# Patient Record
Sex: MALE | Race: WHITE | NOT HISPANIC OR LATINO | Employment: FULL TIME | URBAN - METROPOLITAN AREA
[De-identification: names, ages, dates, MRNs, and addresses within clinical notes are randomized per-mention and may not be internally consistent; named-entity substitution may affect disease eponyms.]

---

## 2018-10-02 ENCOUNTER — CONVERSION ENCOUNTER (OUTPATIENT)
Dept: GASTROENTEROLOGY | Facility: CLINIC | Age: 43
End: 2018-10-02

## 2018-10-02 ENCOUNTER — OFFICE VISIT CONVERTED (OUTPATIENT)
Dept: GASTROENTEROLOGY | Facility: CLINIC | Age: 43
End: 2018-10-02
Attending: INTERNAL MEDICINE

## 2018-12-28 ENCOUNTER — CONVERSION ENCOUNTER (OUTPATIENT)
Dept: GASTROENTEROLOGY | Facility: CLINIC | Age: 43
End: 2018-12-28

## 2018-12-28 ENCOUNTER — OFFICE VISIT CONVERTED (OUTPATIENT)
Dept: GASTROENTEROLOGY | Facility: CLINIC | Age: 43
End: 2018-12-28
Attending: PHYSICIAN ASSISTANT

## 2019-05-28 ENCOUNTER — CONVERSION ENCOUNTER (OUTPATIENT)
Dept: FAMILY MEDICINE CLINIC | Facility: CLINIC | Age: 44
End: 2019-05-28

## 2019-05-28 ENCOUNTER — OFFICE VISIT CONVERTED (OUTPATIENT)
Dept: FAMILY MEDICINE CLINIC | Facility: CLINIC | Age: 44
End: 2019-05-28
Attending: FAMILY MEDICINE

## 2019-11-26 ENCOUNTER — OFFICE VISIT CONVERTED (OUTPATIENT)
Dept: FAMILY MEDICINE CLINIC | Facility: CLINIC | Age: 44
End: 2019-11-26
Attending: FAMILY MEDICINE

## 2020-11-18 ENCOUNTER — OFFICE VISIT CONVERTED (OUTPATIENT)
Dept: FAMILY MEDICINE CLINIC | Facility: CLINIC | Age: 45
End: 2020-11-18
Attending: FAMILY MEDICINE

## 2021-05-09 VITALS
DIASTOLIC BLOOD PRESSURE: 90 MMHG | BODY MASS INDEX: 28.53 KG/M2 | OXYGEN SATURATION: 97 % | WEIGHT: 188.25 LBS | HEIGHT: 68 IN | HEART RATE: 83 BPM | TEMPERATURE: 97.2 F | SYSTOLIC BLOOD PRESSURE: 118 MMHG

## 2021-05-09 VITALS
DIASTOLIC BLOOD PRESSURE: 80 MMHG | TEMPERATURE: 97.1 F | OXYGEN SATURATION: 96 % | WEIGHT: 193.5 LBS | HEART RATE: 76 BPM | SYSTOLIC BLOOD PRESSURE: 122 MMHG

## 2021-05-09 VITALS
SYSTOLIC BLOOD PRESSURE: 104 MMHG | TEMPERATURE: 97 F | HEART RATE: 82 BPM | DIASTOLIC BLOOD PRESSURE: 80 MMHG | WEIGHT: 186 LBS | OXYGEN SATURATION: 98 %

## 2021-05-15 VITALS
DIASTOLIC BLOOD PRESSURE: 76 MMHG | SYSTOLIC BLOOD PRESSURE: 141 MMHG | BODY MASS INDEX: 28.95 KG/M2 | OXYGEN SATURATION: 100 % | WEIGHT: 191 LBS | HEIGHT: 68 IN | HEART RATE: 65 BPM

## 2021-05-16 VITALS
SYSTOLIC BLOOD PRESSURE: 119 MMHG | OXYGEN SATURATION: 100 % | WEIGHT: 188 LBS | HEIGHT: 68 IN | BODY MASS INDEX: 28.49 KG/M2 | DIASTOLIC BLOOD PRESSURE: 77 MMHG | HEART RATE: 67 BPM

## 2021-09-09 ENCOUNTER — OFFICE VISIT (OUTPATIENT)
Dept: FAMILY MEDICINE CLINIC | Facility: CLINIC | Age: 46
End: 2021-09-09

## 2021-09-09 VITALS
SYSTOLIC BLOOD PRESSURE: 120 MMHG | HEIGHT: 68 IN | OXYGEN SATURATION: 98 % | TEMPERATURE: 97.7 F | DIASTOLIC BLOOD PRESSURE: 78 MMHG | HEART RATE: 82 BPM | WEIGHT: 187 LBS | BODY MASS INDEX: 28.34 KG/M2

## 2021-09-09 DIAGNOSIS — Z00.00 WELLNESS EXAMINATION: ICD-10-CM

## 2021-09-09 DIAGNOSIS — M54.50 ACUTE LEFT-SIDED LOW BACK PAIN WITHOUT SCIATICA: ICD-10-CM

## 2021-09-09 DIAGNOSIS — Z13.220 SCREENING CHOLESTEROL LEVEL: Primary | ICD-10-CM

## 2021-09-09 DIAGNOSIS — L98.9 FACIAL LESION: ICD-10-CM

## 2021-09-09 LAB
ALBUMIN SERPL-MCNC: 4.8 G/DL (ref 3.5–5.2)
ALBUMIN/GLOB SERPL: 1.9 G/DL
ALP SERPL-CCNC: 74 U/L (ref 39–117)
ALT SERPL W P-5'-P-CCNC: 33 U/L (ref 1–41)
ANION GAP SERPL CALCULATED.3IONS-SCNC: 9.8 MMOL/L (ref 5–15)
AST SERPL-CCNC: 23 U/L (ref 1–40)
BASOPHILS # BLD AUTO: 0.03 10*3/MM3 (ref 0–0.2)
BASOPHILS NFR BLD AUTO: 0.6 % (ref 0–1.5)
BILIRUB SERPL-MCNC: 0.6 MG/DL (ref 0–1.2)
BUN SERPL-MCNC: 13 MG/DL (ref 6–20)
BUN/CREAT SERPL: 11.8 (ref 7–25)
CALCIUM SPEC-SCNC: 9.6 MG/DL (ref 8.6–10.5)
CHLORIDE SERPL-SCNC: 103 MMOL/L (ref 98–107)
CHOLEST SERPL-MCNC: 187 MG/DL (ref 0–200)
CO2 SERPL-SCNC: 25.2 MMOL/L (ref 22–29)
CREAT SERPL-MCNC: 1.1 MG/DL (ref 0.76–1.27)
DEPRECATED RDW RBC AUTO: 41 FL (ref 37–54)
EOSINOPHIL # BLD AUTO: 0.08 10*3/MM3 (ref 0–0.4)
EOSINOPHIL NFR BLD AUTO: 1.5 % (ref 0.3–6.2)
ERYTHROCYTE [DISTWIDTH] IN BLOOD BY AUTOMATED COUNT: 12.4 % (ref 12.3–15.4)
GFR SERPL CREATININE-BSD FRML MDRD: 72 ML/MIN/1.73
GLOBULIN UR ELPH-MCNC: 2.5 GM/DL
GLUCOSE SERPL-MCNC: 91 MG/DL (ref 65–99)
HCT VFR BLD AUTO: 45.9 % (ref 37.5–51)
HDLC SERPL-MCNC: 49 MG/DL (ref 40–60)
HGB BLD-MCNC: 15.2 G/DL (ref 13–17.7)
IMM GRANULOCYTES # BLD AUTO: 0.01 10*3/MM3 (ref 0–0.05)
IMM GRANULOCYTES NFR BLD AUTO: 0.2 % (ref 0–0.5)
LDLC SERPL CALC-MCNC: 127 MG/DL (ref 0–100)
LDLC/HDLC SERPL: 2.57 {RATIO}
LYMPHOCYTES # BLD AUTO: 1.68 10*3/MM3 (ref 0.7–3.1)
LYMPHOCYTES NFR BLD AUTO: 32 % (ref 19.6–45.3)
MCH RBC QN AUTO: 29.9 PG (ref 26.6–33)
MCHC RBC AUTO-ENTMCNC: 33.1 G/DL (ref 31.5–35.7)
MCV RBC AUTO: 90.2 FL (ref 79–97)
MONOCYTES # BLD AUTO: 0.49 10*3/MM3 (ref 0.1–0.9)
MONOCYTES NFR BLD AUTO: 9.3 % (ref 5–12)
NEUTROPHILS NFR BLD AUTO: 2.96 10*3/MM3 (ref 1.7–7)
NEUTROPHILS NFR BLD AUTO: 56.4 % (ref 42.7–76)
NRBC BLD AUTO-RTO: 0 /100 WBC (ref 0–0.2)
PLATELET # BLD AUTO: 258 10*3/MM3 (ref 140–450)
PMV BLD AUTO: 11.7 FL (ref 6–12)
POTASSIUM SERPL-SCNC: 4.1 MMOL/L (ref 3.5–5.2)
PROT SERPL-MCNC: 7.3 G/DL (ref 6–8.5)
RBC # BLD AUTO: 5.09 10*6/MM3 (ref 4.14–5.8)
SODIUM SERPL-SCNC: 138 MMOL/L (ref 136–145)
TRIGL SERPL-MCNC: 60 MG/DL (ref 0–150)
VLDLC SERPL-MCNC: 11 MG/DL (ref 5–40)
WBC # BLD AUTO: 5.25 10*3/MM3 (ref 3.4–10.8)

## 2021-09-09 PROCEDURE — 36415 COLL VENOUS BLD VENIPUNCTURE: CPT | Performed by: FAMILY MEDICINE

## 2021-09-09 PROCEDURE — 80053 COMPREHEN METABOLIC PANEL: CPT | Performed by: FAMILY MEDICINE

## 2021-09-09 PROCEDURE — 80061 LIPID PANEL: CPT | Performed by: FAMILY MEDICINE

## 2021-09-09 PROCEDURE — 99396 PREV VISIT EST AGE 40-64: CPT | Performed by: FAMILY MEDICINE

## 2021-09-09 PROCEDURE — 85025 COMPLETE CBC W/AUTO DIFF WBC: CPT | Performed by: FAMILY MEDICINE

## 2021-09-09 RX ORDER — FLUTICASONE PROPIONATE 50 MCG
SPRAY, SUSPENSION (ML) NASAL
COMMUNITY

## 2021-09-09 RX ORDER — FEXOFENADINE HCL 180 MG/1
1 TABLET ORAL DAILY
COMMUNITY
End: 2021-12-02 | Stop reason: SDUPTHER

## 2021-09-09 RX ORDER — TIZANIDINE 2 MG/1
2 TABLET ORAL EVERY 8 HOURS PRN
Qty: 30 TABLET | Refills: 0 | Status: SHIPPED | OUTPATIENT
Start: 2021-09-09 | End: 2021-09-20

## 2021-09-09 RX ORDER — MELOXICAM 15 MG/1
15 TABLET ORAL DAILY
Qty: 10 TABLET | Refills: 0 | Status: SHIPPED | OUTPATIENT
Start: 2021-09-09 | End: 2021-09-20

## 2021-09-09 RX ORDER — ALBUTEROL SULFATE 0.63 MG/3ML
SOLUTION RESPIRATORY (INHALATION)
COMMUNITY

## 2021-09-09 NOTE — PROGRESS NOTES
"Chief Complaint  Annual Exam (Would like general check up and fasting labs. ) and Hip Pain (Left hip pain occasionally. )    Subjective          Brendon Melara presents to [facility]  Pt last had colonoscopy 3 yrs ago and it was normal  Pt does not smoke  Pt has had TDAP and covid vaccines  Pt does not need pneumovax  Needs screening labs  Pt has left facial lesion that is rough x 6 yrs- pt has FH of melanoma  Discussed getting colonoscopy for colon cancer screening    Back Pain  This is a recurrent problem. Episode onset: 2 months. The problem occurs intermittently. The problem has been gradually worsening since onset. The pain is present in the lumbar spine. The pain does not radiate. The pain is moderate. The pain is worse during the day. The symptoms are aggravated by bending and twisting. Stiffness is present in the morning. Pertinent negatives include no abdominal pain, bladder incontinence, bowel incontinence, chest pain, dysuria, fever, headaches, leg pain, numbness, paresis, paresthesias, pelvic pain, perianal numbness, tingling, weakness or weight loss. He has tried nothing for the symptoms. The treatment provided no relief.       Objective   No Known Allergies  Immunization History   Administered Date(s) Administered   • COVID-19 (MODERNA) 05/18/2021, 06/15/2021   • Tdap 09/06/2016       Vital Signs:   Vitals:    09/09/21 1048   BP: 120/78   Pulse: 82   Temp: 97.7 °F (36.5 °C)   SpO2: 98%   Weight: 84.8 kg (187 lb)   Height: 172.7 cm (68\")       Physical Exam  Vitals reviewed.   Constitutional:       Appearance: Normal appearance. He is well-developed.   HENT:      Head: Normocephalic and atraumatic.      Right Ear: Tympanic membrane, ear canal and external ear normal.      Left Ear: Tympanic membrane, ear canal and external ear normal.      Nose: Nose normal.      Mouth/Throat:      Mouth: Mucous membranes are moist.      Pharynx: Oropharynx is clear. No oropharyngeal " exudate or posterior oropharyngeal erythema.   Eyes:      Extraocular Movements: Extraocular movements intact.      Conjunctiva/sclera: Conjunctivae normal.      Pupils: Pupils are equal, round, and reactive to light.   Neck:      Thyroid: No thyroid mass, thyromegaly or thyroid tenderness.   Cardiovascular:      Rate and Rhythm: Normal rate and regular rhythm.      Pulses: Normal pulses.      Heart sounds: Normal heart sounds. No murmur heard.   No friction rub. No gallop.    Pulmonary:      Effort: Pulmonary effort is normal.      Breath sounds: Normal breath sounds. No wheezing or rhonchi.   Abdominal:      General: Bowel sounds are normal. There is no distension.      Palpations: Abdomen is soft. There is no mass.      Tenderness: There is no abdominal tenderness. There is no guarding or rebound.      Hernia: No hernia is present.   Musculoskeletal:         General: Normal range of motion.      Cervical back: Normal range of motion and neck supple.      Comments: Left lower back paraspinal muscle tenderness, neg straight leg raise, no redness, warmth, bruising, or swelling, no vertebrae tenderness.   Lymphadenopathy:      Cervical: No cervical adenopathy.   Skin:     General: Skin is warm and dry.      Capillary Refill: Capillary refill takes less than 2 seconds.      Comments: Left cheek 6-8mm flat red rough skin lesion   Neurological:      General: No focal deficit present.      Mental Status: He is alert and oriented to person, place, and time.      Cranial Nerves: No cranial nerve deficit.   Psychiatric:         Mood and Affect: Mood and affect normal.         Behavior: Behavior normal.         Thought Content: Thought content normal.         Judgment: Judgment normal.        Result Review :   The following data was reviewed by: Johnnie Lozano MD on 09/09/2021:    Data reviewed: Radiologic studies I viewed and interpreted 3 views lumbar spine x-rays:no fxs.          Assessment and Plan    Diagnoses and all  orders for this visit:    1. Screening cholesterol level (Primary)  -     Lipid Panel    2. Wellness examination  -     CBC Auto Differential  -     Comprehensive Metabolic Panel  -     Lipid Panel    3. Acute left-sided low back pain without sciatica  -     Ambulatory Referral to Physical Therapy  -     XR Spine Lumbar 2 or 3 View (In Office)  -     meloxicam (Mobic) 15 MG tablet; Take 1 tablet by mouth Daily.  Dispense: 10 tablet; Refill: 0  -     tiZANidine (ZANAFLEX) 2 MG tablet; Take 1 tablet by mouth Every 8 (Eight) Hours As Needed for Muscle Spasms.  Dispense: 30 tablet; Refill: 0    4. Facial lesion  -     Ambulatory Referral to Dermatology            Follow Up   Return in about 1 month (around 10/9/2021) for Recheck.  Patient was given instructions and counseling regarding his condition or for health maintenance advice. Please see specific information pulled into the AVS if appropriate.

## 2021-09-09 NOTE — PROGRESS NOTES
Venipuncture Blood Specimen Collection  Venipuncture performed in left arm by Syl Major with good hemostasis. Patient tolerated the procedure well without complications.   09/09/21   Syl Major

## 2021-09-10 NOTE — PROGRESS NOTES
Labs show mild elevation of cholesterol.  Watch diet and exercise.  Follow-up if have any questions.

## 2021-09-20 DIAGNOSIS — M54.50 ACUTE LEFT-SIDED LOW BACK PAIN WITHOUT SCIATICA: ICD-10-CM

## 2021-09-20 RX ORDER — TIZANIDINE 2 MG/1
TABLET ORAL
Qty: 30 TABLET | Refills: 0 | Status: SHIPPED | OUTPATIENT
Start: 2021-09-20 | End: 2021-09-28

## 2021-09-20 RX ORDER — MELOXICAM 15 MG/1
15 TABLET ORAL DAILY
Qty: 10 TABLET | Refills: 0 | Status: SHIPPED | OUTPATIENT
Start: 2021-09-20 | End: 2021-09-28

## 2021-09-28 DIAGNOSIS — M54.50 ACUTE LEFT-SIDED LOW BACK PAIN WITHOUT SCIATICA: ICD-10-CM

## 2021-09-28 RX ORDER — MELOXICAM 15 MG/1
15 TABLET ORAL DAILY
Qty: 10 TABLET | Refills: 0 | Status: SHIPPED | OUTPATIENT
Start: 2021-09-28 | End: 2021-10-07

## 2021-09-28 RX ORDER — TIZANIDINE 2 MG/1
TABLET ORAL
Qty: 30 TABLET | Refills: 0 | Status: SHIPPED | OUTPATIENT
Start: 2021-09-28 | End: 2021-10-07

## 2021-10-07 DIAGNOSIS — M54.50 ACUTE LEFT-SIDED LOW BACK PAIN WITHOUT SCIATICA: ICD-10-CM

## 2021-10-07 RX ORDER — MELOXICAM 15 MG/1
15 TABLET ORAL DAILY
Qty: 10 TABLET | Refills: 0 | Status: SHIPPED | OUTPATIENT
Start: 2021-10-07 | End: 2021-10-18

## 2021-10-07 RX ORDER — TIZANIDINE 2 MG/1
TABLET ORAL
Qty: 30 TABLET | Refills: 0 | Status: SHIPPED | OUTPATIENT
Start: 2021-10-07 | End: 2021-10-18

## 2021-10-18 DIAGNOSIS — M54.50 ACUTE LEFT-SIDED LOW BACK PAIN WITHOUT SCIATICA: ICD-10-CM

## 2021-10-18 RX ORDER — TIZANIDINE 2 MG/1
TABLET ORAL
Qty: 30 TABLET | Refills: 0 | Status: SHIPPED | OUTPATIENT
Start: 2021-10-18 | End: 2021-10-25

## 2021-10-18 RX ORDER — MELOXICAM 15 MG/1
15 TABLET ORAL DAILY
Qty: 10 TABLET | Refills: 0 | Status: SHIPPED | OUTPATIENT
Start: 2021-10-18 | End: 2021-10-25

## 2021-10-25 DIAGNOSIS — M54.50 ACUTE LEFT-SIDED LOW BACK PAIN WITHOUT SCIATICA: ICD-10-CM

## 2021-10-25 RX ORDER — TIZANIDINE 2 MG/1
TABLET ORAL
Qty: 30 TABLET | Refills: 0 | Status: SHIPPED | OUTPATIENT
Start: 2021-10-25

## 2021-10-25 RX ORDER — MELOXICAM 15 MG/1
15 TABLET ORAL DAILY
Qty: 10 TABLET | Refills: 0 | Status: SHIPPED | OUTPATIENT
Start: 2021-10-25

## 2021-11-04 ENCOUNTER — TREATMENT (OUTPATIENT)
Dept: PHYSICAL THERAPY | Facility: CLINIC | Age: 46
End: 2021-11-04

## 2021-11-04 DIAGNOSIS — M54.50 ACUTE LEFT-SIDED LOW BACK PAIN WITHOUT SCIATICA: Primary | ICD-10-CM

## 2021-11-04 DIAGNOSIS — M25.60 STIFFNESS IN JOINT: ICD-10-CM

## 2021-11-04 PROCEDURE — 97110 THERAPEUTIC EXERCISES: CPT | Performed by: PHYSICAL THERAPIST

## 2021-11-04 PROCEDURE — 97161 PT EVAL LOW COMPLEX 20 MIN: CPT | Performed by: PHYSICAL THERAPIST

## 2021-11-04 NOTE — PROGRESS NOTES
Physical Therapy Initial Evaluation and Plan of Care    Patient: Brendon Melara   : 1975  Diagnosis/ICD-10 Code:  Acute left-sided low back pain without sciatica [M54.50]  Referring practitioner: Johnnie Lozano MD  Date of Initial Visit: 2021  Today's Date: 2021  Patient seen for 1 sessions           Subjective Questionnaire: Oswestry: 10/50 = 20% Disability      Subjective Evaluation    History of Present Illness  Mechanism of injury: The patient presents to physical therapy with complaints of left sided low back pain. This pain is sharp and occurs quickly. The pain is located on the left side just below his belt line in the back of his left hip. This pain has been occurring intermittently for nearly a year. The frequency has gradually worsened. His pain occurs with prolonged sitting (20-30 minutes) and occasionally while sleeping at night. For pain relief, he changes positions. He isn't taking anything regularly for pain management. He does take a prescribed muscle relaxer sparingly. He has a history of a slipped disc in his low back, right hip pain and bilateral knee pain from his time in the .     Pain  Current pain ratin  At worst pain ratin    Diagnostic Tests  X-ray: normal    Patient Goals  Patient goal: The patient would like to have decreased pain and be able to manage symptoms when they occur.           Objective          Postural Observations  Seated posture: Slight forward slump with increased thoracic kyphosisn and decreased lumbar lordosis.        Palpation     Additional Palpation Details  Mild tenderness to palpation in lower left lumbar paraspinals. No tenderness noted with palpation of posterior left hip musculature/bony anatomy.    Tenderness     Additional Tenderness Details  None noted in left hip    Neurological Testing     Additional Neurological Details  Sensation to light touch intact and equal bilaterally from L2-S1 except for right side  hyposensation in S1 dermatome.    Seated Slump: (-) bilaterally for increased sciatic neural tension    Supine passive SLR: (-) bilaterally for increased sciatic neural tension    Active Range of Motion     Lumbar   Flexion: 65 (Patient squatted when returning to standing rather than straightening up directly) degrees   Extension: 20 degrees   Left lateral flexion: Active left lumbar lateral flexion: 1'' above joint line.   Right lateral flexion: Active right lumbar lateral flexion: knee joint line.   Left rotation: Active left lumbar rotation: 75%   Right rotation: Active right lumbar rotation: 75%   Left Hip   Normal active range of motion    Right Hip   Normal active range of motion    Strength/Myotome Testing     Left Hip   Planes of Motion   Flexion: 4+  Extension: 4  Abduction: 4    Right Hip   Planes of Motion   Flexion: 5  Extension: 4+  Abduction: 4+    Left Knee   Flexion: 4+  Extension: 5    Right Knee   Flexion: 5  Extension: 5    Left Ankle/Foot   Dorsiflexion: 4+    Right Ankle/Foot   Dorsiflexion: 5    Additional Strength Details  Abdominal strength graded 4+/5 via curl up test    Tests     Left Hip   Negative LANDON and awilda.       See Exercise, Manual, and Modality Logs for complete treatment.     Assessment & Plan     Assessment  Impairments: abnormal muscle firing, abnormal muscle tone, abnormal or restricted ROM, impaired physical strength and lacks appropriate home exercise program  Assessment details: The patient presents to physical therapy with complaints of sharp intermittent posterior left hip pain that has been ongoing over the past year. He presents with associated lumbar stiffness, tenderness to palpation in the lumbar spine, left hip weakness, and functional mobility deficits (LAKSHMI). His signs/symptoms are consistent with lumbar disc derangement. He would benefit from skilled PT to address these deficits and to allow the patient to return to his prior level of function.  Prognosis:  good  Functional Limitations: sleeping, pushing, uncomfortable because of pain, moving in bed, sitting and stooping  Goals  Plan Goals: LOW BACK PROBLEMS:    1. The patient complains of low back pain.  LTG 1: 6 weeks:  The patient will report a pain rating of 2/10 or better at worst (9/10 at worst at time of evaluation) in order to improve  tolerance to activities of daily living and improve sleep quality.  STATUS:  New  STG 1a: 4 weeks:  The patient will report a pain rating of 4/10 or better at worst.  STATUS:  New  TREATMENT:  Therapeutic exercises, manual therapy, aquatic therapy, home exercise   instruction, and modalities as needed for pain to include:  electrical stimulation, moist heat, ice,   ultrasound, and diathermy.      2. The patient demonstrates weakness of the left hip.  LTG 2: 6 weeks:  The patient will demonstrate 4+ /5 strength for left hip flexion, abduction,  and extension in order to improve hip stability.  STATUS:  New  TREATMENT: Therapeutic exercises, manual therapy, aquatic therapy, home exercise instruction,  and modalities as needed for pain to include:  electrical stimulation, moist heat, ice, ultrasound, and   diathermy.    3. The patient has limited lumbar AROM  LTG 3: 6 weeks:  The patient will demonstrate pain free lumbar AROM WFL for flexion, extension, bilateral side bending, and bilateral rotation.  STATUS:  New  TREATMENT: Manual therapy, therapeutic exercise, home exercise instruction, and modalities as needed to include: moist heat, electrical stimulation, and ultrasound.      4. Mobility: Walking/Moving Around Functional Limitation    LTG 4: 6 weeks:  The patient will demonstrate 0 % limitation by achieving a score of 0/50 on the LAKSHMI.  STATUS:  New  STG 4 a: 4 weeks:  The patient will demonstrate 10 % limitation by achieving a score of 5/50 on the LAKSHMI.    STATUS:  New  TREATMENT:  Manual therapy, therapeutic exercise, home exercise instruction, and modalities as needed to  include: moist heat, electrical stimulation, and ultrasound.    Plan  Therapy options: will be seen for skilled physical therapy services  Planned modality interventions: cryotherapy, electrical stimulation/Russian stimulation, TENS, dry needling and traction  Planned therapy interventions: balance/weight-bearing training, ADL retraining, soft tissue mobilization, strengthening, stretching, therapeutic activities, joint mobilization, home exercise program, functional ROM exercises, flexibility, body mechanics training, postural training, neuromuscular re-education, manual therapy, abdominal trunk stabilization, IADL retraining and spinal/joint mobilization  Frequency: 2x week  Duration in weeks: 6  Treatment plan discussed with: patient        Visit Diagnoses:    ICD-10-CM ICD-9-CM   1. Acute left-sided low back pain without sciatica  M54.50 724.2   2. Stiffness in joint  M25.60 719.50       History # of Personal Factors and/or Comorbidities: LOW (0)  Examination of Body System(s): # of elements: LOW (1-2)  Clinical Presentation: STABLE   Clinical Decision Making: LOW       Timed:         Manual Therapy:    0     mins  97010;     Therapeutic Exercise:    8     mins  95046;     Neuromuscular Huseyin:    0    mins  70174;    Therapeutic Activity:     0     mins  37389;     Gait Trainin     mins  25698;     Ultrasound:     0     mins  67014;    Ionto                               0    mins   20796  Self Care                       0     mins   91928  Canalith Repos    0     mins 91604      Un-Timed:  Electrical Stimulation:    0     mins  40054 ( );  Dry Needling     0     mins self-pay  Traction     0     mins 33638  Low Eval     30     Mins  36654  Mod Eval     0     Mins  39206  High Eval                       0     Mins  77935  Re-Eval                           0    mins  27795    Timed Treatment:   8   mins   Total Treatment:     38   mins    PT SIGNATURE: Samuel Atkinson PT    Electronically signed  11/4/2021    [place] License: PT - [id number]         Initial Certification  Certification Period: 11/4/2021 thru 2/1/2022  I certify that the therapy services are furnished while this patient is under my care.  The services outlined above are required by this patient, and will be reviewed every 90 days.     PHYSICIAN: Johnnie Lozano MD      DATE:     Please sign and return via fax to [phone]. Thank you, [facility].

## 2021-11-11 ENCOUNTER — TELEPHONE (OUTPATIENT)
Dept: PHYSICAL THERAPY | Facility: CLINIC | Age: 46
End: 2021-11-11

## 2021-11-22 ENCOUNTER — OFFICE VISIT (OUTPATIENT)
Dept: FAMILY MEDICINE CLINIC | Facility: CLINIC | Age: 46
End: 2021-11-22

## 2021-11-22 VITALS
HEART RATE: 75 BPM | SYSTOLIC BLOOD PRESSURE: 138 MMHG | DIASTOLIC BLOOD PRESSURE: 99 MMHG | OXYGEN SATURATION: 99 % | BODY MASS INDEX: 29.13 KG/M2 | WEIGHT: 191.6 LBS | TEMPERATURE: 97.5 F

## 2021-11-22 DIAGNOSIS — B00.1 HERPES LABIALIS: ICD-10-CM

## 2021-11-22 DIAGNOSIS — T78.40XA ALLERGIC REACTION, INITIAL ENCOUNTER: Primary | ICD-10-CM

## 2021-11-22 PROCEDURE — 99213 OFFICE O/P EST LOW 20 MIN: CPT | Performed by: FAMILY MEDICINE

## 2021-11-22 RX ORDER — MONTELUKAST SODIUM 10 MG/1
TABLET ORAL
COMMUNITY

## 2021-11-22 RX ORDER — VALACYCLOVIR HYDROCHLORIDE 1 G/1
2000 TABLET, FILM COATED ORAL 2 TIMES DAILY
Qty: 4 TABLET | Refills: 3 | Status: SHIPPED | OUTPATIENT
Start: 2021-11-22 | End: 2021-12-05 | Stop reason: SDUPTHER

## 2021-11-22 RX ORDER — IBUPROFEN 800 MG/1
TABLET ORAL
COMMUNITY
End: 2022-06-21

## 2021-11-22 RX ORDER — PREDNISONE 20 MG/1
40 TABLET ORAL DAILY
Qty: 10 TABLET | Refills: 0 | Status: SHIPPED | OUTPATIENT
Start: 2021-11-22 | End: 2021-11-27

## 2021-11-22 NOTE — PROGRESS NOTES
Chief Complaint  Allergic Rhinitis (Lips swollen. Follow-up )    Subjective          Brendonstarr Melara presents to [facility]  Pt gets swollen lips from allergies during allergy season- latest flare-up has been 3 weeks- pt has been getting this intermittently x 5-6 yrs after receiving vaccines in   Pt has no throat swelling or irritation- this is how his typical flare-up reacts  ROS: pt has had no recent fevers, soa, cough, vision changes, headaches, chest pains, palpitations, syncope, dizziness, nausea, vomiting, diarrhea, abdominal pain, rashes, joint pain, depression, anxiety, memory problems, leg swelling.        Objective   No Known Allergies  Immunization History   Administered Date(s) Administered   • COVID-19 (MODERNA) 1st, 2nd, 3rd Dose Only 05/18/2021, 06/15/2021   • Tdap 09/06/2016       Vital Signs:   Vitals:    11/22/21 0809   BP: 138/99   Pulse: 75   Temp: 97.5 °F (36.4 °C)   SpO2: 99%   Weight: 86.9 kg (191 lb 9.6 oz)       Physical Exam  Vitals reviewed.   Constitutional:       Appearance: Normal appearance. He is well-developed.   HENT:      Head: Normocephalic and atraumatic.      Right Ear: Tympanic membrane, ear canal and external ear normal.      Left Ear: Tympanic membrane, ear canal and external ear normal.      Nose: Nose normal.      Mouth/Throat:      Mouth: Mucous membranes are moist.      Pharynx: Oropharynx is clear. No oropharyngeal exudate or posterior oropharyngeal erythema.      Comments: Uvula midline, throat open, no abscesses seen, lips upper and lower generalized mildly swollen and red and tender.  Eyes:      Conjunctiva/sclera: Conjunctivae normal.      Pupils: Pupils are equal, round, and reactive to light.   Cardiovascular:      Rate and Rhythm: Normal rate and regular rhythm.      Pulses: Normal pulses.      Heart sounds: Normal heart sounds. No murmur heard.  No friction rub. No gallop.    Pulmonary:      Effort: Pulmonary effort is  normal.      Breath sounds: Normal breath sounds. No wheezing or rhonchi.   Abdominal:      General: Bowel sounds are normal. There is no distension.      Palpations: Abdomen is soft.      Tenderness: There is no abdominal tenderness.   Musculoskeletal:         General: Normal range of motion.      Cervical back: Normal range of motion and neck supple.   Lymphadenopathy:      Cervical: No cervical adenopathy.   Skin:     General: Skin is warm and dry.      Capillary Refill: Capillary refill takes less than 2 seconds.   Neurological:      General: No focal deficit present.      Mental Status: He is alert and oriented to person, place, and time.      Cranial Nerves: No cranial nerve deficit.   Psychiatric:         Mood and Affect: Mood and affect normal.         Behavior: Behavior normal.         Thought Content: Thought content normal.         Judgment: Judgment normal.        Result Review :                 Assessment and Plan    Diagnoses and all orders for this visit:    1. Allergic reaction, initial encounter (Primary)  -     predniSONE (DELTASONE) 20 MG tablet; Take 2 tablets by mouth Daily for 5 days.  Dispense: 10 tablet; Refill: 0  -     Ambulatory Referral to Allergy    2. Herpes labialis  -     valACYclovir (Valtrex) 1000 MG tablet; Take 2 tablets by mouth 2 (Two) Times a Day.  Dispense: 4 tablet; Refill: 3            Follow Up   Return if symptoms worsen or fail to improve.  Patient was given instructions and counseling regarding his condition or for health maintenance advice. Please see specific information pulled into the AVS if appropriate.

## 2021-12-02 ENCOUNTER — OFFICE VISIT (OUTPATIENT)
Dept: FAMILY MEDICINE CLINIC | Facility: CLINIC | Age: 46
End: 2021-12-02

## 2021-12-02 ENCOUNTER — TREATMENT (OUTPATIENT)
Dept: PHYSICAL THERAPY | Facility: CLINIC | Age: 46
End: 2021-12-02

## 2021-12-02 VITALS
DIASTOLIC BLOOD PRESSURE: 80 MMHG | WEIGHT: 188 LBS | SYSTOLIC BLOOD PRESSURE: 118 MMHG | HEART RATE: 81 BPM | OXYGEN SATURATION: 99 % | BODY MASS INDEX: 28.59 KG/M2 | TEMPERATURE: 98 F

## 2021-12-02 DIAGNOSIS — M25.60 STIFFNESS IN JOINT: ICD-10-CM

## 2021-12-02 DIAGNOSIS — T78.40XD ALLERGIC REACTION, SUBSEQUENT ENCOUNTER: Primary | ICD-10-CM

## 2021-12-02 DIAGNOSIS — M54.50 ACUTE LEFT-SIDED LOW BACK PAIN WITHOUT SCIATICA: Primary | ICD-10-CM

## 2021-12-02 DIAGNOSIS — J30.89 NON-SEASONAL ALLERGIC RHINITIS, UNSPECIFIED TRIGGER: ICD-10-CM

## 2021-12-02 DIAGNOSIS — J02.9 PHARYNGITIS, UNSPECIFIED ETIOLOGY: ICD-10-CM

## 2021-12-02 LAB
ALBUMIN SERPL-MCNC: 4.7 G/DL (ref 3.5–5.2)
ALBUMIN/GLOB SERPL: 2 G/DL
ALP SERPL-CCNC: 71 U/L (ref 39–117)
ALT SERPL W P-5'-P-CCNC: 51 U/L (ref 1–41)
ANION GAP SERPL CALCULATED.3IONS-SCNC: 5.7 MMOL/L (ref 5–15)
AST SERPL-CCNC: 26 U/L (ref 1–40)
BASOPHILS # BLD AUTO: 0.03 10*3/MM3 (ref 0–0.2)
BASOPHILS NFR BLD AUTO: 0.5 % (ref 0–1.5)
BILIRUB SERPL-MCNC: 0.5 MG/DL (ref 0–1.2)
BUN SERPL-MCNC: 15 MG/DL (ref 6–20)
BUN/CREAT SERPL: 14.7 (ref 7–25)
CALCIUM SPEC-SCNC: 9.6 MG/DL (ref 8.6–10.5)
CHLORIDE SERPL-SCNC: 104 MMOL/L (ref 98–107)
CO2 SERPL-SCNC: 28.3 MMOL/L (ref 22–29)
CREAT SERPL-MCNC: 1.02 MG/DL (ref 0.76–1.27)
DEPRECATED RDW RBC AUTO: 41.8 FL (ref 37–54)
EOSINOPHIL # BLD AUTO: 0.14 10*3/MM3 (ref 0–0.4)
EOSINOPHIL NFR BLD AUTO: 2.3 % (ref 0.3–6.2)
ERYTHROCYTE [DISTWIDTH] IN BLOOD BY AUTOMATED COUNT: 12.9 % (ref 12.3–15.4)
GFR SERPL CREATININE-BSD FRML MDRD: 79 ML/MIN/1.73
GLOBULIN UR ELPH-MCNC: 2.3 GM/DL
GLUCOSE SERPL-MCNC: 85 MG/DL (ref 65–99)
HCT VFR BLD AUTO: 45 % (ref 37.5–51)
HGB BLD-MCNC: 14.9 G/DL (ref 13–17.7)
IMM GRANULOCYTES # BLD AUTO: 0.02 10*3/MM3 (ref 0–0.05)
IMM GRANULOCYTES NFR BLD AUTO: 0.3 % (ref 0–0.5)
LYMPHOCYTES # BLD AUTO: 1.88 10*3/MM3 (ref 0.7–3.1)
LYMPHOCYTES NFR BLD AUTO: 31.5 % (ref 19.6–45.3)
MCH RBC QN AUTO: 29.7 PG (ref 26.6–33)
MCHC RBC AUTO-ENTMCNC: 33.1 G/DL (ref 31.5–35.7)
MCV RBC AUTO: 89.6 FL (ref 79–97)
MONOCYTES # BLD AUTO: 0.58 10*3/MM3 (ref 0.1–0.9)
MONOCYTES NFR BLD AUTO: 9.7 % (ref 5–12)
NEUTROPHILS NFR BLD AUTO: 3.32 10*3/MM3 (ref 1.7–7)
NEUTROPHILS NFR BLD AUTO: 55.7 % (ref 42.7–76)
NRBC BLD AUTO-RTO: 0 /100 WBC (ref 0–0.2)
PLATELET # BLD AUTO: 267 10*3/MM3 (ref 140–450)
PMV BLD AUTO: 11.1 FL (ref 6–12)
POTASSIUM SERPL-SCNC: 5 MMOL/L (ref 3.5–5.2)
PROT SERPL-MCNC: 7 G/DL (ref 6–8.5)
RBC # BLD AUTO: 5.02 10*6/MM3 (ref 4.14–5.8)
SODIUM SERPL-SCNC: 138 MMOL/L (ref 136–145)
WBC NRBC COR # BLD: 5.97 10*3/MM3 (ref 3.4–10.8)

## 2021-12-02 PROCEDURE — 99213 OFFICE O/P EST LOW 20 MIN: CPT | Performed by: FAMILY MEDICINE

## 2021-12-02 PROCEDURE — 36415 COLL VENOUS BLD VENIPUNCTURE: CPT | Performed by: FAMILY MEDICINE

## 2021-12-02 PROCEDURE — 80053 COMPREHEN METABOLIC PANEL: CPT | Performed by: FAMILY MEDICINE

## 2021-12-02 PROCEDURE — 87081 CULTURE SCREEN ONLY: CPT | Performed by: FAMILY MEDICINE

## 2021-12-02 PROCEDURE — 85025 COMPLETE CBC W/AUTO DIFF WBC: CPT | Performed by: FAMILY MEDICINE

## 2021-12-02 PROCEDURE — 97110 THERAPEUTIC EXERCISES: CPT | Performed by: PHYSICAL THERAPIST

## 2021-12-02 PROCEDURE — 97530 THERAPEUTIC ACTIVITIES: CPT | Performed by: PHYSICAL THERAPIST

## 2021-12-02 RX ORDER — FEXOFENADINE HCL 180 MG/1
180 TABLET ORAL DAILY
Qty: 90 TABLET | Refills: 1 | Status: SHIPPED | OUTPATIENT
Start: 2021-12-02

## 2021-12-02 RX ORDER — PREDNISONE 10 MG/1
TABLET ORAL
Qty: 45 TABLET | Refills: 0 | Status: SHIPPED | OUTPATIENT
Start: 2021-12-02 | End: 2022-03-03 | Stop reason: SDUPTHER

## 2021-12-02 NOTE — PROGRESS NOTES
Chief Complaint  Edema (Follow-up from lips swelling. Returned after completing prednisone)    Subjective          Brendon Melara presents to [facility]  Pt says that prednisone helped resolve symptoms but symptoms came back after stopping prednisone    Pt has had return of symptoms x 1.5 weeks- sudden onset- symptoms unchanged    ROS: pt has had no recent fevers, soa, cough, vision changes, headaches, chest pains, palpitations, syncope, dizziness, nausea, vomiting, diarrhea, abdominal pain, rashes, joint pain, depression, anxiety, memory problems, leg swelling.      Pt will see allergy 12/22/2021        Objective   No Known Allergies  Immunization History   Administered Date(s) Administered   • COVID-19 (MODERNA) 1st, 2nd, 3rd Dose Only 05/18/2021, 06/15/2021   • Tdap 09/06/2016       Vital Signs:   Vitals:    12/02/21 1421   BP: 118/80   Pulse: 81   Temp: 98 °F (36.7 °C)   SpO2: 99%   Weight: 85.3 kg (188 lb)       Physical Exam  Vitals reviewed.   Constitutional:       Appearance: Normal appearance. He is well-developed.   HENT:      Head: Normocephalic and atraumatic.      Right Ear: Tympanic membrane, ear canal and external ear normal.      Left Ear: Tympanic membrane, ear canal and external ear normal.      Nose: Nose normal.      Mouth/Throat:      Mouth: Mucous membranes are moist.      Pharynx: Oropharynx is clear. Posterior oropharyngeal erythema present. No oropharyngeal exudate.      Comments: Lip mildly swollen, red, uvula midline, throat open, no abscesses seen.  Eyes:      Conjunctiva/sclera: Conjunctivae normal.      Pupils: Pupils are equal, round, and reactive to light.   Cardiovascular:      Rate and Rhythm: Normal rate and regular rhythm.      Pulses: Normal pulses.      Heart sounds: Normal heart sounds. No murmur heard.  No friction rub. No gallop.    Pulmonary:      Effort: Pulmonary effort is normal.      Breath sounds: Normal breath sounds. No wheezing or  rhonchi.   Abdominal:      General: Bowel sounds are normal. There is no distension.      Palpations: Abdomen is soft.      Tenderness: There is no abdominal tenderness.   Musculoskeletal:         General: Normal range of motion.      Cervical back: Normal range of motion and neck supple.   Lymphadenopathy:      Cervical: No cervical adenopathy.   Skin:     General: Skin is warm and dry.      Capillary Refill: Capillary refill takes less than 2 seconds.   Neurological:      General: No focal deficit present.      Mental Status: He is alert and oriented to person, place, and time.      Cranial Nerves: No cranial nerve deficit.   Psychiatric:         Mood and Affect: Mood and affect normal.         Behavior: Behavior normal.         Thought Content: Thought content normal.         Judgment: Judgment normal.        Result Review :                 Assessment and Plan    Diagnoses and all orders for this visit:    1. Allergic reaction, subsequent encounter (Primary)  -     predniSONE (DELTASONE) 10 MG tablet; Take 5 tabs PO daily x 3 days, then 4 tabs PO daily x 3 days, then 3 tabs PO daily x 3 days, then 2 tabs Po daily x 3 days, then 1 tab PO daily x 3 days.  Dispense: 45 tablet; Refill: 0  -     CBC Auto Differential  -     Comprehensive Metabolic Panel    2. Pharyngitis, unspecified etiology  -     Throat / Upper Respiratory Culture - Swab, Throat    3. Non-seasonal allergic rhinitis, unspecified trigger  -     fexofenadine (Allegra Allergy) 180 MG tablet; Take 1 tablet by mouth Daily.  Dispense: 90 tablet; Refill: 1            Follow Up   Return if symptoms worsen or fail to improve.  Patient was given instructions and counseling regarding his condition or for health maintenance advice. Please see specific information pulled into the AVS if appropriate.

## 2021-12-02 NOTE — PROGRESS NOTES
Physical Therapy Daily Progress Note    Patient: Brendon Melara   : 1975  Diagnosis/ICD-10 Code:  Acute left-sided low back pain without sciatica [M54.50]  Referring practitioner: Johnnie Lozano MD  Date of Initial Visit: Type: THERAPY  Noted: 2021  Today's Date: 2021  Patient seen for 2 sessions           Subjective   The patient reported that his back pain is maybe a 2/10 today. He has been complaint with his home exercise program. He can tell that his back is consistently getting better. He feels comfortable with discontinuing PT at this time to focus on a HEP.     Objective   See Exercise, Manual, and Modality Logs for complete treatment.     Assessment/Plan  The patient demonstrated good tolerance to all interventions today. He has progressed well with an independent HEP. He was provided with a progressed HEP today to reflect exercises performed in the clinic. He doesn't have any additional visits scheduled at this time and will be discharged in 30 days if symptoms do not return. He was advised to contact the clinic if he needs additional PT.       Timed:  Manual Therapy:    0     mins  69737;  Therapeutic Exercise:    15     mins  67775;     Neuromuscular Huseyin:   0    mins  00391;    Therapeutic Activity:     10     mins  63674;     Gait Trainin     mins  02363;     Aquatics                         0      mins  88229    Un-timed:  Mechanical Traction      0     mins  86887  Dry Needling     0     mins self-pay  Electrical Stimulation:    0     mins  16501 ( );      Timed Treatment:   25   mins   Total Treatment:     25   mins    Samuel Atkinson PT  Physical Therapist    Electronically signed 2021    [place] License: PT - [id number]

## 2021-12-03 NOTE — PROGRESS NOTES
Labs show no significant abnormalities except one liver enzyme is mildly elevated.  You may need to get liver ultrasound if liver has not been worked-up.  Follow-up in 1-2 weeks to discuss.

## 2021-12-04 LAB — BACTERIA SPEC AEROBE CULT: NORMAL

## 2021-12-05 DIAGNOSIS — B00.1 HERPES LABIALIS: ICD-10-CM

## 2021-12-06 DIAGNOSIS — B00.1 HERPES LABIALIS: ICD-10-CM

## 2021-12-06 RX ORDER — VALACYCLOVIR HYDROCHLORIDE 1 G/1
2000 TABLET, FILM COATED ORAL 2 TIMES DAILY
Qty: 4 TABLET | Refills: 3 | Status: SHIPPED | OUTPATIENT
Start: 2021-12-06

## 2021-12-06 RX ORDER — VALACYCLOVIR HYDROCHLORIDE 1 G/1
2000 TABLET, FILM COATED ORAL 2 TIMES DAILY
Qty: 4 TABLET | Refills: 3 | Status: SHIPPED | OUTPATIENT
Start: 2021-12-06 | End: 2021-12-06 | Stop reason: SDUPTHER

## 2022-02-10 ENCOUNTER — OFFICE VISIT (OUTPATIENT)
Dept: FAMILY MEDICINE CLINIC | Facility: CLINIC | Age: 47
End: 2022-02-10

## 2022-02-10 VITALS
SYSTOLIC BLOOD PRESSURE: 118 MMHG | BODY MASS INDEX: 29.55 KG/M2 | DIASTOLIC BLOOD PRESSURE: 72 MMHG | HEIGHT: 68 IN | WEIGHT: 195 LBS | TEMPERATURE: 97.1 F | OXYGEN SATURATION: 97 % | HEART RATE: 76 BPM

## 2022-02-10 DIAGNOSIS — R22.0 SWELLING OF BOTH LIPS: Primary | ICD-10-CM

## 2022-02-10 LAB
FOLATE SERPL-MCNC: 15.1 NG/ML (ref 4.78–24.2)
VIT B12 BLD-MCNC: 811 PG/ML (ref 211–946)

## 2022-02-10 PROCEDURE — 82746 ASSAY OF FOLIC ACID SERUM: CPT | Performed by: FAMILY MEDICINE

## 2022-02-10 PROCEDURE — 86235 NUCLEAR ANTIGEN ANTIBODY: CPT | Performed by: FAMILY MEDICINE

## 2022-02-10 PROCEDURE — 82607 VITAMIN B-12: CPT | Performed by: FAMILY MEDICINE

## 2022-02-10 PROCEDURE — 86769 SARS-COV-2 COVID-19 ANTIBODY: CPT | Performed by: FAMILY MEDICINE

## 2022-02-10 PROCEDURE — 86225 DNA ANTIBODY NATIVE: CPT | Performed by: FAMILY MEDICINE

## 2022-02-10 PROCEDURE — 36415 COLL VENOUS BLD VENIPUNCTURE: CPT | Performed by: FAMILY MEDICINE

## 2022-02-10 PROCEDURE — 86038 ANTINUCLEAR ANTIBODIES: CPT | Performed by: FAMILY MEDICINE

## 2022-02-10 PROCEDURE — 99214 OFFICE O/P EST MOD 30 MIN: CPT | Performed by: FAMILY MEDICINE

## 2022-02-10 RX ORDER — DOXYCYCLINE HYCLATE 100 MG/1
100 CAPSULE ORAL 2 TIMES DAILY
Qty: 14 CAPSULE | Refills: 0 | Status: SHIPPED | OUTPATIENT
Start: 2022-02-10 | End: 2022-02-17

## 2022-02-10 RX ORDER — EPINEPHRINE 0.3 MG/.3ML
INJECTION SUBCUTANEOUS
COMMUNITY
Start: 2022-01-24

## 2022-02-10 RX ORDER — MOMETASONE FUROATE 1 MG/G
CREAM TOPICAL
COMMUNITY
Start: 2021-12-22

## 2022-02-10 RX ORDER — LEVOCETIRIZINE DIHYDROCHLORIDE 5 MG/1
TABLET, FILM COATED ORAL
COMMUNITY
Start: 2022-02-09

## 2022-02-10 RX ORDER — FAMOTIDINE 20 MG/1
20 TABLET, FILM COATED ORAL 2 TIMES DAILY
COMMUNITY
Start: 2022-01-24

## 2022-02-10 RX ORDER — AZELASTINE 1 MG/ML
SPRAY, METERED NASAL
COMMUNITY
Start: 2021-12-22

## 2022-02-10 RX ORDER — DOXYCYCLINE HYCLATE 50 MG/1
50 CAPSULE ORAL 2 TIMES DAILY
Qty: 20 CAPSULE | Refills: 0 | Status: SHIPPED | OUTPATIENT
Start: 2022-02-18 | End: 2022-02-28

## 2022-02-10 NOTE — PROGRESS NOTES
Venipuncture Blood Specimen Collection  Venipuncture performed in left arm by Syl Major with good hemostasis. Patient tolerated the procedure well without complications.   02/10/22   Syl Major

## 2022-02-10 NOTE — PROGRESS NOTES
Chief Complaint  Lip Laceration (Not a lip cut,swollen lips and senative,been going on since Nov2021)    Subjective          Brendon Melara presents to [facility]  Pt has been having problems with red swollen lips over the last 3 months- pt says flare-ups resolve with prednisone and the reappear- pt has seen allergy and they are not able to find a solution- pt will be calling his dermatologist back and discuss this with them    Rash  This is a recurrent problem. The current episode started more than 1 month ago. The problem has been waxing and waning since onset. The affected locations include the face and lips. The rash is characterized by burning, pain, redness and swelling. He was exposed to nothing. Associated symptoms include facial edema. Past treatments include antihistamine and oral steroids. The treatment provided moderate relief.       Objective   No Known Allergies  Immunization History   Administered Date(s) Administered   • COVID-19 (MODERNA) 1st, 2nd, 3rd Dose Only 05/18/2021, 06/15/2021   • Tdap 09/06/2016     History reviewed. No pertinent past medical history.   History reviewed. No pertinent surgical history.   Social History     Socioeconomic History   • Marital status:    Tobacco Use   • Smoking status: Former Smoker   • Smokeless tobacco: Never Used   Vaping Use   • Vaping Use: Never used        Current Outpatient Medications:   •  albuterol (ACCUNEB) 0.63 MG/3ML nebulizer solution, albuterol sulfate 0.63 mg/3 mL inhalation solution for nebulization use in nebulizer as directed   Active, Disp: , Rfl:   •  azelastine (ASTELIN) 0.1 % nasal spray, USE 1 TO 2 SPRAYS IN EACH NOSTRIL TWICE DAILY AS NEEDED, Disp: , Rfl:   •  EPINEPHrine (EPIPEN) 0.3 MG/0.3ML solution auto-injector injection, USE AS DIRECTED FOR ACUTE ALLERGIC REACTION, Disp: , Rfl:   •  famotidine (PEPCID) 20 MG tablet, Take 20 mg by mouth 2 (Two) Times a Day., Disp: , Rfl:   •  fexofenadine  "(Allegra Allergy) 180 MG tablet, Take 1 tablet by mouth Daily., Disp: 90 tablet, Rfl: 1  •  fluticasone (FLONASE) 50 MCG/ACT nasal spray, fluticasone 50 mcg/actuation nasal spray,suspension spray 1 spray (50 mcg) in each nostril by intranasal route once daily   Suspended, Disp: , Rfl:   •  ibuprofen (ADVIL,MOTRIN) 800 MG tablet, ibuprofen 800 mg oral tablet take 1 tablet (800 mg) by oral route 3 times per day with food   Active, Disp: , Rfl:   •  levocetirizine (XYZAL) 5 MG tablet, , Disp: , Rfl:   •  meloxicam (MOBIC) 15 MG tablet, TAKE 1 TABLET BY MOUTH DAILY, Disp: 10 tablet, Rfl: 0  •  mometasone (ELOCON) 0.1 % cream, APPLY TOPICALLY TO THE AFFECTED AREA EVERY DAY AS NEEDED, Disp: , Rfl:   •  montelukast (Singulair) 10 MG tablet, Take  by mouth., Disp: , Rfl:   •  tiZANidine (ZANAFLEX) 2 MG tablet, TAKE 1 TABLET BY MOUTH EVERY 8 HOURS AS NEEDED FOR MUSCLE SPASMS, Disp: 30 tablet, Rfl: 0  •  valACYclovir (Valtrex) 1000 MG tablet, Take 2 tablets by mouth 2 (Two) Times a Day., Disp: 4 tablet, Rfl: 3  •  doxycycline (VIBRAMYCIN) 100 MG capsule, Take 1 capsule by mouth 2 (Two) Times a Day for 7 days., Disp: 14 capsule, Rfl: 0  •  [START ON 2/18/2022] doxycycline (VIBRAMYCIN) 50 MG capsule, Take 1 capsule by mouth 2 (Two) Times a Day for 10 days., Disp: 20 capsule, Rfl: 0  •  predniSONE (DELTASONE) 10 MG tablet, Take 5 tabs PO daily x 3 days, then 4 tabs PO daily x 3 days, then 3 tabs PO daily x 3 days, then 2 tabs Po daily x 3 days, then 1 tab PO daily x 3 days., Disp: 45 tablet, Rfl: 0   History reviewed. No pertinent family history.       Vital Signs:   Vitals:    02/10/22 0805   BP: 118/72   BP Location: Left arm   Patient Position: Sitting   Cuff Size: Adult   Pulse: 76   Temp: 97.1 °F (36.2 °C)   TempSrc: Temporal   SpO2: 97%   Weight: 88.5 kg (195 lb)   Height: 172.7 cm (68\")       Physical Exam  Vitals reviewed.   Constitutional:       Appearance: Normal appearance. He is well-developed.   HENT:      Head: " Normocephalic and atraumatic.      Right Ear: External ear normal.      Left Ear: External ear normal.      Mouth/Throat:      Pharynx: No oropharyngeal exudate.   Eyes:      Conjunctiva/sclera: Conjunctivae normal.      Pupils: Pupils are equal, round, and reactive to light.   Cardiovascular:      Rate and Rhythm: Normal rate and regular rhythm.      Pulses: Normal pulses.      Heart sounds: Normal heart sounds. No murmur heard.  No friction rub. No gallop.    Pulmonary:      Effort: Pulmonary effort is normal.      Breath sounds: Normal breath sounds. No wheezing or rhonchi.   Abdominal:      General: Bowel sounds are normal. There is no distension.      Palpations: Abdomen is soft.      Tenderness: There is no abdominal tenderness.   Musculoskeletal:         General: Normal range of motion.   Skin:     General: Skin is warm and dry.      Capillary Refill: Capillary refill takes less than 2 seconds.      Comments: Upper and lower lips are moderately swollen and red with 3mm red border around lips.  No rash anywhere else.   Neurological:      Mental Status: He is alert and oriented to person, place, and time.      Cranial Nerves: No cranial nerve deficit.   Psychiatric:         Mood and Affect: Mood and affect normal.         Behavior: Behavior normal.         Thought Content: Thought content normal.         Judgment: Judgment normal.        Result Review :                 Assessment and Plan    Diagnoses and all orders for this visit:    1. Swelling of both lips (Primary)  -     doxycycline (VIBRAMYCIN) 100 MG capsule; Take 1 capsule by mouth 2 (Two) Times a Day for 7 days.  Dispense: 14 capsule; Refill: 0  -     doxycycline (VIBRAMYCIN) 50 MG capsule; Take 1 capsule by mouth 2 (Two) Times a Day for 10 days.  Dispense: 20 capsule; Refill: 0  -     STEWART With / DsDNA, RNP, Sjogrens A / B, Lozano  -     Vitamin B12 & Folate  -     SARS-CoV-2 Antibodies (Roche)            Follow Up   Return in about 3 weeks (around  3/3/2022) for Recheck.  Patient was given instructions and counseling regarding his condition or for health maintenance advice. Please see specific information pulled into the AVS if appropriate.       Answers for HPI/ROS submitted by the patient on 2/7/2022  What is the primary reason for your visit?: Rash

## 2022-02-11 LAB
ANA SER QL: NEGATIVE
SARS-COV-2 AB SERPL QL IA: POSITIVE

## 2022-03-03 ENCOUNTER — OFFICE VISIT (OUTPATIENT)
Dept: FAMILY MEDICINE CLINIC | Facility: CLINIC | Age: 47
End: 2022-03-03

## 2022-03-03 VITALS — SYSTOLIC BLOOD PRESSURE: 110 MMHG | DIASTOLIC BLOOD PRESSURE: 84 MMHG

## 2022-03-03 DIAGNOSIS — R22.0 LIP SWELLING: Primary | ICD-10-CM

## 2022-03-03 DIAGNOSIS — T78.40XD ALLERGIC REACTION, SUBSEQUENT ENCOUNTER: ICD-10-CM

## 2022-03-03 PROCEDURE — 86003 ALLG SPEC IGE CRUDE XTRC EA: CPT | Performed by: FAMILY MEDICINE

## 2022-03-03 PROCEDURE — 99213 OFFICE O/P EST LOW 20 MIN: CPT | Performed by: FAMILY MEDICINE

## 2022-03-03 PROCEDURE — 36415 COLL VENOUS BLD VENIPUNCTURE: CPT | Performed by: FAMILY MEDICINE

## 2022-03-03 PROCEDURE — 82785 ASSAY OF IGE: CPT | Performed by: FAMILY MEDICINE

## 2022-03-03 PROCEDURE — 86008 ALLG SPEC IGE RECOMB EA: CPT | Performed by: FAMILY MEDICINE

## 2022-03-03 RX ORDER — PREDNISONE 10 MG/1
TABLET ORAL
Qty: 45 TABLET | Refills: 0 | Status: SHIPPED | OUTPATIENT
Start: 2022-03-03

## 2022-03-03 NOTE — PROGRESS NOTES
Chief Complaint  Allergic Reaction (Follow-up Lips swelling )    Subjective          Brendon Melara presents to [facility]  Pt had good week last week has swollen lips only this week- cam back over the weekend- pt seeing allergy and will be seeing dermatology next week      Objective   No Known Allergies  Immunization History   Administered Date(s) Administered   • COVID-19 (MODERNA) 1st, 2nd, 3rd Dose Only 05/18/2021, 06/15/2021   • Tdap 09/06/2016     History reviewed. No pertinent past medical history.   History reviewed. No pertinent surgical history.   Social History     Socioeconomic History   • Marital status:    Tobacco Use   • Smoking status: Former Smoker   • Smokeless tobacco: Never Used   Vaping Use   • Vaping Use: Never used        Current Outpatient Medications:   •  albuterol (ACCUNEB) 0.63 MG/3ML nebulizer solution, albuterol sulfate 0.63 mg/3 mL inhalation solution for nebulization use in nebulizer as directed   Active, Disp: , Rfl:   •  azelastine (ASTELIN) 0.1 % nasal spray, USE 1 TO 2 SPRAYS IN EACH NOSTRIL TWICE DAILY AS NEEDED, Disp: , Rfl:   •  EPINEPHrine (EPIPEN) 0.3 MG/0.3ML solution auto-injector injection, USE AS DIRECTED FOR ACUTE ALLERGIC REACTION, Disp: , Rfl:   •  famotidine (PEPCID) 20 MG tablet, Take 20 mg by mouth 2 (Two) Times a Day., Disp: , Rfl:   •  fexofenadine (Allegra Allergy) 180 MG tablet, Take 1 tablet by mouth Daily., Disp: 90 tablet, Rfl: 1  •  fluticasone (FLONASE) 50 MCG/ACT nasal spray, fluticasone 50 mcg/actuation nasal spray,suspension spray 1 spray (50 mcg) in each nostril by intranasal route once daily   Suspended, Disp: , Rfl:   •  ibuprofen (ADVIL,MOTRIN) 800 MG tablet, ibuprofen 800 mg oral tablet take 1 tablet (800 mg) by oral route 3 times per day with food   Active, Disp: , Rfl:   •  levocetirizine (XYZAL) 5 MG tablet, , Disp: , Rfl:   •  meloxicam (MOBIC) 15 MG tablet, TAKE 1 TABLET BY MOUTH DAILY, Disp: 10 tablet,  Rfl: 0  •  mometasone (ELOCON) 0.1 % cream, APPLY TOPICALLY TO THE AFFECTED AREA EVERY DAY AS NEEDED, Disp: , Rfl:   •  montelukast (Singulair) 10 MG tablet, Take  by mouth., Disp: , Rfl:   •  predniSONE (DELTASONE) 10 MG tablet, Take 5 tabs PO daily x 3 days, then 4 tabs PO daily x 3 days, then 3 tabs PO daily x 3 days, then 2 tabs Po daily x 3 days, then 1 tab PO daily x 3 days., Disp: 45 tablet, Rfl: 0  •  tiZANidine (ZANAFLEX) 2 MG tablet, TAKE 1 TABLET BY MOUTH EVERY 8 HOURS AS NEEDED FOR MUSCLE SPASMS, Disp: 30 tablet, Rfl: 0  •  valACYclovir (Valtrex) 1000 MG tablet, Take 2 tablets by mouth 2 (Two) Times a Day., Disp: 4 tablet, Rfl: 3   History reviewed. No pertinent family history.       Vital Signs:   Vitals:    03/03/22 0812   BP: 110/84       Review of Systems   Constitutional: Negative for fatigue and fever.   HENT: Negative for sore throat.    Eyes: Negative for visual disturbance.   Respiratory: Negative for cough, chest tightness, shortness of breath and wheezing.    Cardiovascular: Negative for chest pain, palpitations and leg swelling.   Gastrointestinal: Negative for abdominal pain, diarrhea, nausea and vomiting.   Musculoskeletal: Negative for arthralgias.   Neurological: Negative for light-headedness and headaches.      Physical Exam  Vitals reviewed.   Constitutional:       Appearance: Normal appearance. He is well-developed.   HENT:      Head: Normocephalic and atraumatic.      Right Ear: External ear normal.      Left Ear: External ear normal.      Nose: Nose normal.      Mouth/Throat:      Mouth: Mucous membranes are moist.      Pharynx: Oropharynx is clear. No oropharyngeal exudate or posterior oropharyngeal erythema.   Eyes:      Conjunctiva/sclera: Conjunctivae normal.      Pupils: Pupils are equal, round, and reactive to light.   Cardiovascular:      Rate and Rhythm: Normal rate and regular rhythm.      Pulses: Normal pulses.      Heart sounds: Normal heart sounds. No murmur heard.  No  friction rub. No gallop.    Pulmonary:      Effort: Pulmonary effort is normal.      Breath sounds: Normal breath sounds. No wheezing or rhonchi.   Abdominal:      General: Abdomen is flat. Bowel sounds are normal. There is no distension.      Palpations: Abdomen is soft. There is no mass.      Tenderness: There is no abdominal tenderness. There is no guarding or rebound.      Hernia: No hernia is present.   Musculoskeletal:         General: Normal range of motion.   Skin:     General: Skin is warm and dry.      Capillary Refill: Capillary refill takes less than 2 seconds.      Comments: Swollen red lips only   Neurological:      General: No focal deficit present.      Mental Status: He is alert and oriented to person, place, and time.      Cranial Nerves: No cranial nerve deficit.   Psychiatric:         Mood and Affect: Mood and affect normal.         Behavior: Behavior normal.         Thought Content: Thought content normal.         Judgment: Judgment normal.        Result Review :                 Assessment and Plan    Diagnoses and all orders for this visit:    1. Lip swelling (Primary)  -     predniSONE (DELTASONE) 10 MG tablet; Take 5 tabs PO daily x 3 days, then 4 tabs PO daily x 3 days, then 3 tabs PO daily x 3 days, then 2 tabs Po daily x 3 days, then 1 tab PO daily x 3 days.  Dispense: 45 tablet; Refill: 0  -     Galactose-alpha-1, 3-galactose Allergen Specific IgE    2. Allergic reaction, subsequent encounter  -     predniSONE (DELTASONE) 10 MG tablet; Take 5 tabs PO daily x 3 days, then 4 tabs PO daily x 3 days, then 3 tabs PO daily x 3 days, then 2 tabs Po daily x 3 days, then 1 tab PO daily x 3 days.  Dispense: 45 tablet; Refill: 0            Follow Up   Return if symptoms worsen or fail to improve.  Patient was given instructions and counseling regarding his condition or for health maintenance advice. Please see specific information pulled into the AVS if appropriate.       Answers for HPI/ROS  submitted by the patient on 3/1/2022  What is the primary reason for your visit?: Other  Please describe your symptoms.: Swollen, inflamed, paniful lips. Chronic.  Have you had these symptoms before?: Yes  How long have you been having these symptoms?: Greater than 2 weeks  Please list any medications you are currently taking for this condition.: Levocetirizine, Famotidine, Meloxicam.  Please describe any probable cause for these symptoms. : Allergies, possible food or environmental.

## 2022-03-03 NOTE — PROGRESS NOTES
Venipuncture Blood Specimen Collection  Venipuncture performed in left arm by Syl Major with good hemostasis. Patient tolerated the procedure well without complications.   03/03/22   Syl Major

## 2022-03-07 LAB
ALPHA-GAL IGE QN: <0.1 KU/L
BEEF IGE QN: <0.1 KU/L
CONV CLASS DESCRIPTION: NORMAL
IGE SERPL-ACNC: 82 IU/ML (ref 6–495)
LAMB IGE QN: <0.1 KU/L
PORK IGE QN: <0.1 KU/L

## 2022-06-21 ENCOUNTER — OFFICE VISIT (OUTPATIENT)
Dept: FAMILY MEDICINE CLINIC | Facility: CLINIC | Age: 47
End: 2022-06-21

## 2022-06-21 VITALS
HEART RATE: 85 BPM | OXYGEN SATURATION: 98 % | BODY MASS INDEX: 29.19 KG/M2 | TEMPERATURE: 98 F | SYSTOLIC BLOOD PRESSURE: 116 MMHG | WEIGHT: 192 LBS | DIASTOLIC BLOOD PRESSURE: 78 MMHG

## 2022-06-21 DIAGNOSIS — M25.512 ACUTE PAIN OF LEFT SHOULDER: Primary | ICD-10-CM

## 2022-06-21 PROCEDURE — 99213 OFFICE O/P EST LOW 20 MIN: CPT | Performed by: FAMILY MEDICINE

## 2022-06-21 NOTE — PROGRESS NOTES
Chief Complaint  Shoulder Pain (Left sided, X1 month)    Subjective          Brendon Melara presents to [facility]  Left shoulder pain x 1 month- no known injury- has trouble with movement      Objective   No Known Allergies  Immunization History   Administered Date(s) Administered   • COVID-19 (MODERNA) 1st, 2nd, 3rd Dose Only 05/18/2021, 06/15/2021   • Tdap 09/06/2016     History reviewed. No pertinent past medical history.   History reviewed. No pertinent surgical history.   Social History     Socioeconomic History   • Marital status:    Tobacco Use   • Smoking status: Former Smoker     Packs/day: 1.00     Years: 5.00     Pack years: 5.00     Quit date: 1/1/2007     Years since quitting: 15.4   • Smokeless tobacco: Former User     Quit date: 2007   Vaping Use   • Vaping Use: Never used        Current Outpatient Medications:   •  albuterol (ACCUNEB) 0.63 MG/3ML nebulizer solution, albuterol sulfate 0.63 mg/3 mL inhalation solution for nebulization use in nebulizer as directed   Active, Disp: , Rfl:   •  azelastine (ASTELIN) 0.1 % nasal spray, USE 1 TO 2 SPRAYS IN EACH NOSTRIL TWICE DAILY AS NEEDED, Disp: , Rfl:   •  EPINEPHrine (EPIPEN) 0.3 MG/0.3ML solution auto-injector injection, USE AS DIRECTED FOR ACUTE ALLERGIC REACTION, Disp: , Rfl:   •  famotidine (PEPCID) 20 MG tablet, Take 20 mg by mouth 2 (Two) Times a Day., Disp: , Rfl:   •  fexofenadine (Allegra Allergy) 180 MG tablet, Take 1 tablet by mouth Daily., Disp: 90 tablet, Rfl: 1  •  fluticasone (FLONASE) 50 MCG/ACT nasal spray, fluticasone 50 mcg/actuation nasal spray,suspension spray 1 spray (50 mcg) in each nostril by intranasal route once daily   Suspended, Disp: , Rfl:   •  meloxicam (MOBIC) 15 MG tablet, TAKE 1 TABLET BY MOUTH DAILY, Disp: 10 tablet, Rfl: 0  •  tiZANidine (ZANAFLEX) 2 MG tablet, TAKE 1 TABLET BY MOUTH EVERY 8 HOURS AS NEEDED FOR MUSCLE SPASMS, Disp: 30 tablet, Rfl: 0  •  levocetirizine (XYZAL)  5 MG tablet, , Disp: , Rfl:   •  mometasone (ELOCON) 0.1 % cream, APPLY TOPICALLY TO THE AFFECTED AREA EVERY DAY AS NEEDED, Disp: , Rfl:   •  montelukast (SINGULAIR) 10 MG tablet, Take  by mouth., Disp: , Rfl:   •  predniSONE (DELTASONE) 10 MG tablet, Take 5 tabs PO daily x 3 days, then 4 tabs PO daily x 3 days, then 3 tabs PO daily x 3 days, then 2 tabs Po daily x 3 days, then 1 tab PO daily x 3 days., Disp: 45 tablet, Rfl: 0  •  valACYclovir (Valtrex) 1000 MG tablet, Take 2 tablets by mouth 2 (Two) Times a Day., Disp: 4 tablet, Rfl: 3   History reviewed. No pertinent family history.       Vital Signs:   Vitals:    06/21/22 1256   BP: 116/78   Pulse: 85   Temp: 98 °F (36.7 °C)   SpO2: 98%   Weight: 87.1 kg (192 lb)       Review of Systems   Physical Exam  Vitals reviewed.   Constitutional:       Appearance: Normal appearance. He is well-developed.   HENT:      Head: Normocephalic and atraumatic.      Right Ear: External ear normal.      Left Ear: External ear normal.      Mouth/Throat:      Pharynx: No oropharyngeal exudate.   Eyes:      Conjunctiva/sclera: Conjunctivae normal.      Pupils: Pupils are equal, round, and reactive to light.   Cardiovascular:      Rate and Rhythm: Normal rate and regular rhythm.      Pulses: Normal pulses.      Heart sounds: Normal heart sounds. No murmur heard.    No friction rub. No gallop.   Pulmonary:      Effort: Pulmonary effort is normal.      Breath sounds: Normal breath sounds. No wheezing or rhonchi.   Abdominal:      General: Abdomen is flat. Bowel sounds are normal. There is no distension.      Palpations: Abdomen is soft. There is no mass.      Tenderness: There is no abdominal tenderness. There is no guarding or rebound.      Hernia: No hernia is present.   Musculoskeletal:      Comments: Left anterior shoulder muscle tenderness, dec ROM, stable, no redness, warmth, swelling, or bruising.   Skin:     General: Skin is warm and dry.      Capillary Refill: Capillary refill  takes less than 2 seconds.   Neurological:      General: No focal deficit present.      Mental Status: He is alert and oriented to person, place, and time.      Cranial Nerves: No cranial nerve deficit.   Psychiatric:         Mood and Affect: Mood and affect normal.         Behavior: Behavior normal.         Thought Content: Thought content normal.         Judgment: Judgment normal.        Result Review :   The following data was reviewed by: Johnnie Lozano MD on 06/21/2022:    Data reviewed: Radiologic studies I viewed and interpreted 3 views left shoulder x-rays:no fxs.          Assessment and Plan    Diagnoses and all orders for this visit:    1. Acute pain of left shoulder (Primary)  -     XR Shoulder 2+ View Left (In Office)  -     Ambulatory Referral to Orthopedic Surgery            Follow Up   Return if symptoms worsen or fail to improve.  Patient was given instructions and counseling regarding his condition or for health maintenance advice. Please see specific information pulled into the AVS if appropriate.       Answers for HPI/ROS submitted by the patient on 6/20/2022  What is the primary reason for your visit?: Other  Please describe your symptoms.: Shoulder pain, left.  Have you had these symptoms before?: Yes  How long have you been having these symptoms?: Greater than 2 weeks  Please describe any probable cause for these symptoms. : Arthritis.

## 2022-07-04 DIAGNOSIS — T78.40XD ALLERGIC REACTION, SUBSEQUENT ENCOUNTER: ICD-10-CM

## 2022-07-04 DIAGNOSIS — R22.0 LIP SWELLING: ICD-10-CM

## 2022-07-05 ENCOUNTER — DOCUMENTATION (OUTPATIENT)
Dept: PHYSICAL THERAPY | Facility: CLINIC | Age: 47
End: 2022-07-05

## 2022-07-05 RX ORDER — PREDNISONE 10 MG/1
TABLET ORAL
Qty: 45 TABLET | Refills: 0 | OUTPATIENT
Start: 2022-07-05

## 2022-07-05 NOTE — PROGRESS NOTES
Discharge Summary  Discharge Summary from Physical Therapy Report      Dates  PT visit: 11/4/21 - 12/2/21  Number of Visits: 2     Discharge Status of Patient: See note dated 12/2/21    Goals: Partially Met    Discharge Plan: Continue with current home exercise program as instructed    Date of Discharge 7/5/22        Samuel Atkinson, PT  Physical Therapist

## 2022-07-07 ENCOUNTER — OFFICE VISIT (OUTPATIENT)
Dept: ORTHOPEDIC SURGERY | Facility: CLINIC | Age: 47
End: 2022-07-07

## 2022-07-07 VITALS — WEIGHT: 185 LBS | HEIGHT: 68 IN | BODY MASS INDEX: 28.04 KG/M2

## 2022-07-07 DIAGNOSIS — S43.432A TEAR OF LEFT GLENOID LABRUM, INITIAL ENCOUNTER: ICD-10-CM

## 2022-07-07 DIAGNOSIS — M25.512 ACUTE PAIN OF LEFT SHOULDER: Primary | ICD-10-CM

## 2022-07-07 PROCEDURE — 99203 OFFICE O/P NEW LOW 30 MIN: CPT | Performed by: ORTHOPAEDIC SURGERY

## 2022-07-07 NOTE — PROGRESS NOTES
"Chief Complaint  Pain and Initial Evaluation of the Left Shoulder     Subjective      Brendon Melara presents to [facility] for evaluation of the left shoulder. The patient thinks he tore something weight lifting about 2 months ago. He has had shoulder pain for years. He reports he has a sharp pain to the shoulder now. He has no other complaints. He takes meloxicam.     No Known Allergies     Social History     Socioeconomic History   • Marital status:    Tobacco Use   • Smoking status: Former Smoker     Packs/day: 1.00     Years: 5.00     Pack years: 5.00     Quit date: 1/1/2007     Years since quitting: 15.5   • Smokeless tobacco: Former User     Quit date: 2007   Vaping Use   • Vaping Use: Never used        Review of Systems     Objective   Vital Signs:   Ht 172.7 cm (68\")   Wt 83.9 kg (185 lb)   BMI 28.13 kg/m²       Physical Exam  Constitutional:       Appearance: Normal appearance. The patient is well-developed and normal weight.   HENT:      Head: Normocephalic.      Right Ear: Hearing and external ear normal.      Left Ear: Hearing and external ear normal.      Nose: Nose normal.   Eyes:      Conjunctiva/sclera: Conjunctivae normal.   Cardiovascular:      Rate and Rhythm: Normal rate.   Pulmonary:      Effort: Pulmonary effort is normal.      Breath sounds: No wheezing or rales.   Abdominal:      Palpations: Abdomen is soft.      Tenderness: There is no abdominal tenderness.   Musculoskeletal:      Cervical back: Normal range of motion.   Skin:     Findings: No rash.   Neurological:      Mental Status: The patient is alert and oriented to person, place, and time.   Psychiatric:         Mood and Affect: Mood and affect normal.         Judgment: Judgment normal.       Ortho Exam      Left shoulder- non-tender. 4+ supraspinatus strength, negative impingement signs. Negative cross arm adduction. 4+ infraspinatus, pain with resisted External Rotation. 5/5 subscapularis and " supraspinatus strength. Positive O'tesha    Procedures      Imaging Results (Most Recent)     None           Result Review :       XR Shoulder 2+ View Left (In Office)    Result Date: 6/21/2022  Narrative: PROCEDURE: XR SHOULDER 2+ VW LEFT  COMPARISON: None  INDICATIONS: left shoulder pain  FINDINGS:  BONES: Normal.  No significant arthropathy or acute abnormality.  SOFT TISSUES: Negative.  No visible soft tissue swelling.  EFFUSION: None visible.  OTHER: Negative.       Impression:  No acute disease.       BRODY HANKINS MD       Electronically Signed and Approved By: BRODY HANKINS MD on 6/21/2022 at 13:28                      Assessment and Plan     Diagnoses and all orders for this visit:    1. Acute pain of left shoulder (Primary)    2. Tear of left glenoid labrum, initial encounter        Discussed the treatment plan with the patient.  I reviewed his previous x-rays. Plan for MRI arthrogram to evaluate the labrum. The patient expressed understanding and wished to proceed.     Call or return if worsening symptoms.    Follow Up     After MRI Arthrogram.       Patient was given instructions and counseling regarding his condition or for health maintenance advice. Please see specific information pulled into the AVS if appropriate.     Scribed for Cayetano Butler MD by Brianne Napoles.  07/07/22   09:40 EDT    I have personally performed the services described in this document as scribed by the above individual and it is both accurate and complete. Cayetano Butler MD 07/07/22

## 2022-07-12 DIAGNOSIS — B35.4 TINEA CORPORIS: Primary | ICD-10-CM

## 2022-07-12 RX ORDER — CLOTRIMAZOLE 1 %
1 CREAM (GRAM) TOPICAL 2 TIMES DAILY
Qty: 60 G | Refills: 0 | Status: SHIPPED | OUTPATIENT
Start: 2022-07-12 | End: 2022-08-02

## 2022-07-12 RX ORDER — FLUCONAZOLE 200 MG/1
200 TABLET ORAL DAILY
Qty: 5 TABLET | Refills: 0 | Status: SHIPPED | OUTPATIENT
Start: 2022-07-12 | End: 2022-07-17

## 2022-07-13 DIAGNOSIS — B35.4 TINEA CORPORIS: ICD-10-CM

## 2022-07-13 RX ORDER — FLUCONAZOLE 200 MG/1
200 TABLET ORAL DAILY
Qty: 5 TABLET | Refills: 0 | OUTPATIENT
Start: 2022-07-13 | End: 2022-07-18

## 2022-07-13 NOTE — TELEPHONE ENCOUNTER
Caller: [facility]     Relationship:     Best call back number:     Requested Prescriptions:   Requested Prescriptions     Pending Prescriptions Disp Refills   • fluconazole (Diflucan) 200 MG tablet 5 tablet 0     Sig: Take 1 tablet by mouth Daily for 5 days.        Pharmacy where request should be sent:  [facility]       Does the patient have less than a 3 day supply:  [] Yes  [x] No    Denia ALLEN Rep   07/13/22 15:04 EDT

## 2022-07-25 DIAGNOSIS — B35.4 TINEA CORPORIS: ICD-10-CM

## 2022-07-26 RX ORDER — FLUCONAZOLE 200 MG/1
200 TABLET ORAL DAILY
Qty: 5 TABLET | Refills: 0 | OUTPATIENT
Start: 2022-07-26 | End: 2022-07-31

## 2022-08-02 ENCOUNTER — OFFICE VISIT (OUTPATIENT)
Dept: FAMILY MEDICINE CLINIC | Facility: CLINIC | Age: 47
End: 2022-08-02

## 2022-08-02 VITALS
BODY MASS INDEX: 28.95 KG/M2 | HEART RATE: 90 BPM | DIASTOLIC BLOOD PRESSURE: 60 MMHG | WEIGHT: 191 LBS | OXYGEN SATURATION: 98 % | TEMPERATURE: 97.2 F | SYSTOLIC BLOOD PRESSURE: 116 MMHG | HEIGHT: 68 IN

## 2022-08-02 DIAGNOSIS — B35.4 TINEA CORPORIS: Primary | ICD-10-CM

## 2022-08-02 PROCEDURE — 99213 OFFICE O/P EST LOW 20 MIN: CPT | Performed by: FAMILY MEDICINE

## 2022-08-02 RX ORDER — FLUCONAZOLE 200 MG/1
200 TABLET ORAL DAILY
Qty: 5 TABLET | Refills: 0 | Status: SHIPPED | OUTPATIENT
Start: 2022-08-02 | End: 2022-08-07

## 2022-08-02 NOTE — PROGRESS NOTES
Chief Complaint  Follow-up (Follow up on his lips.  )    Subjective          Brendon Melara presents to [facility]  Rash  This is a chronic problem. Episode onset: 10 months. The problem has been gradually improving since onset. Location: lips only. Rash characteristics: redness. He was exposed to nothing. Pertinent negatives include no anorexia, congestion, cough, diarrhea, eye pain, facial edema, fatigue, fever, joint pain, nail changes, rhinorrhea, shortness of breath, sore throat or vomiting. Past treatments include nothing.       Objective   No Known Allergies  Immunization History   Administered Date(s) Administered   • COVID-19 (MODERNA) 1st, 2nd, 3rd Dose Only 05/18/2021, 06/15/2021   • Tdap 09/06/2016     History reviewed. No pertinent past medical history.   History reviewed. No pertinent surgical history.   Social History     Socioeconomic History   • Marital status:    Tobacco Use   • Smoking status: Former Smoker     Packs/day: 1.00     Years: 5.00     Pack years: 5.00     Quit date: 1/1/2007     Years since quitting: 15.5   • Smokeless tobacco: Former User     Quit date: 2007   Vaping Use   • Vaping Use: Never used        Current Outpatient Medications:   •  albuterol (ACCUNEB) 0.63 MG/3ML nebulizer solution, albuterol sulfate 0.63 mg/3 mL inhalation solution for nebulization use in nebulizer as directed   Active, Disp: , Rfl:   •  azelastine (ASTELIN) 0.1 % nasal spray, USE 1 TO 2 SPRAYS IN EACH NOSTRIL TWICE DAILY AS NEEDED, Disp: , Rfl:   •  EPINEPHrine (EPIPEN) 0.3 MG/0.3ML solution auto-injector injection, USE AS DIRECTED FOR ACUTE ALLERGIC REACTION, Disp: , Rfl:   •  famotidine (PEPCID) 20 MG tablet, Take 20 mg by mouth 2 (Two) Times a Day., Disp: , Rfl:   •  fexofenadine (Allegra Allergy) 180 MG tablet, Take 1 tablet by mouth Daily., Disp: 90 tablet, Rfl: 1  •  fluticasone (FLONASE) 50 MCG/ACT nasal spray, fluticasone 50 mcg/actuation nasal  "spray,suspension spray 1 spray (50 mcg) in each nostril by intranasal route once daily   Suspended, Disp: , Rfl:   •  levocetirizine (XYZAL) 5 MG tablet, , Disp: , Rfl:   •  meloxicam (MOBIC) 15 MG tablet, TAKE 1 TABLET BY MOUTH DAILY, Disp: 10 tablet, Rfl: 0  •  mometasone (ELOCON) 0.1 % cream, APPLY TOPICALLY TO THE AFFECTED AREA EVERY DAY AS NEEDED, Disp: , Rfl:   •  montelukast (SINGULAIR) 10 MG tablet, Take  by mouth., Disp: , Rfl:   •  predniSONE (DELTASONE) 10 MG tablet, Take 5 tabs PO daily x 3 days, then 4 tabs PO daily x 3 days, then 3 tabs PO daily x 3 days, then 2 tabs Po daily x 3 days, then 1 tab PO daily x 3 days., Disp: 45 tablet, Rfl: 0  •  tiZANidine (ZANAFLEX) 2 MG tablet, TAKE 1 TABLET BY MOUTH EVERY 8 HOURS AS NEEDED FOR MUSCLE SPASMS, Disp: 30 tablet, Rfl: 0  •  valACYclovir (Valtrex) 1000 MG tablet, Take 2 tablets by mouth 2 (Two) Times a Day., Disp: 4 tablet, Rfl: 3  •  Clotrimazole 1 % ointment, Apply 1 application topically 2 (Two) Times a Day., Disp: 56.7 g, Rfl: 2  •  fluconazole (Diflucan) 200 MG tablet, Take 1 tablet by mouth Daily for 5 days., Disp: 5 tablet, Rfl: 0   History reviewed. No pertinent family history.       Vital Signs:   Vitals:    08/02/22 1043   BP: 116/60   BP Location: Right arm   Patient Position: Sitting   Cuff Size: Adult   Pulse: 90   Temp: 97.2 °F (36.2 °C)   TempSrc: Temporal   SpO2: 98%   Weight: 86.6 kg (191 lb)   Height: 172.7 cm (68\")       Review of Systems   Constitutional: Negative for fatigue and fever.   HENT: Negative for congestion, rhinorrhea and sore throat.    Eyes: Negative for pain.   Respiratory: Negative for cough and shortness of breath.    Gastrointestinal: Negative for anorexia, diarrhea and vomiting.   Musculoskeletal: Negative for joint pain.   Skin: Positive for rash. Negative for nail changes.      Physical Exam  Vitals reviewed.   Constitutional:       Appearance: Normal appearance. He is well-developed.   HENT:      Head: Normocephalic " and atraumatic.      Right Ear: External ear normal.      Left Ear: External ear normal.      Mouth/Throat:      Pharynx: No oropharyngeal exudate.   Eyes:      Conjunctiva/sclera: Conjunctivae normal.      Pupils: Pupils are equal, round, and reactive to light.   Cardiovascular:      Rate and Rhythm: Normal rate and regular rhythm.      Pulses: Normal pulses.      Heart sounds: Normal heart sounds. No murmur heard.    No friction rub. No gallop.   Pulmonary:      Effort: Pulmonary effort is normal.      Breath sounds: Normal breath sounds. No wheezing or rhonchi.   Abdominal:      General: Abdomen is flat. Bowel sounds are normal. There is no distension.      Palpations: Abdomen is soft. There is no mass.      Tenderness: There is no abdominal tenderness. There is no guarding or rebound.      Hernia: No hernia is present.   Musculoskeletal:         General: Normal range of motion.   Skin:     General: Skin is warm and dry.      Capillary Refill: Capillary refill takes less than 2 seconds.      Comments: Lips almost back to normal- redness almost resolved.   Neurological:      Mental Status: He is alert and oriented to person, place, and time.      Cranial Nerves: No cranial nerve deficit.   Psychiatric:         Mood and Affect: Mood and affect normal.         Behavior: Behavior normal.         Thought Content: Thought content normal.         Judgment: Judgment normal.        Result Review :                 Assessment and Plan    Diagnoses and all orders for this visit:    1. Tinea corporis (Primary)  -     fluconazole (Diflucan) 200 MG tablet; Take 1 tablet by mouth Daily for 5 days.  Dispense: 5 tablet; Refill: 0  -     Clotrimazole 1 % ointment; Apply 1 application topically 2 (Two) Times a Day.  Dispense: 56.7 g; Refill: 2            Follow Up   Return if symptoms worsen or fail to improve.  Patient was given instructions and counseling regarding his condition or for health maintenance advice. Please see  specific information pulled into the AVS if appropriate.

## 2022-08-05 ENCOUNTER — HOSPITAL ENCOUNTER (OUTPATIENT)
Dept: INTERVENTIONAL RADIOLOGY/VASCULAR | Facility: HOSPITAL | Age: 47
Discharge: HOME | End: 2022-08-05

## 2022-08-05 ENCOUNTER — HOSPITAL ENCOUNTER (OUTPATIENT)
Dept: MRI IMAGING | Facility: HOSPITAL | Age: 47
Discharge: HOME | End: 2022-08-05

## 2022-08-05 DIAGNOSIS — M25.512 ACUTE PAIN OF LEFT SHOULDER: ICD-10-CM

## 2022-08-05 DIAGNOSIS — S43.432A TEAR OF LEFT GLENOID LABRUM, INITIAL ENCOUNTER: ICD-10-CM

## 2022-08-05 PROCEDURE — 0 GADOBENATE DIMEGLUMINE 529 MG/ML SOLUTION: Performed by: ORTHOPAEDIC SURGERY

## 2022-08-05 PROCEDURE — A9577 INJ MULTIHANCE: HCPCS | Performed by: ORTHOPAEDIC SURGERY

## 2022-08-05 PROCEDURE — 25010000002 IOPAMIDOL 61 % SOLUTION: Performed by: ORTHOPAEDIC SURGERY

## 2022-08-05 PROCEDURE — 73222 MRI JOINT UPR EXTREM W/DYE: CPT

## 2022-08-05 PROCEDURE — 77002 NEEDLE LOCALIZATION BY XRAY: CPT

## 2022-08-05 RX ORDER — SODIUM CHLORIDE 9 MG/ML
10 INJECTION INTRAVENOUS
Status: COMPLETED | OUTPATIENT
Start: 2022-08-05 | End: 2022-08-05

## 2022-08-05 RX ORDER — LIDOCAINE HYDROCHLORIDE 20 MG/ML
20 INJECTION, SOLUTION INFILTRATION; PERINEURAL ONCE
Status: COMPLETED | OUTPATIENT
Start: 2022-08-05 | End: 2022-08-05

## 2022-08-05 RX ADMIN — SODIUM BICARBONATE 10 MEQ: 84 INJECTION, SOLUTION INTRAVENOUS at 13:50

## 2022-08-05 RX ADMIN — LIDOCAINE HYDROCHLORIDE 20 ML: 20 INJECTION, SOLUTION INFILTRATION; PERINEURAL at 13:50

## 2022-08-05 RX ADMIN — SODIUM CHLORIDE 10 ML: 9 INJECTION, SOLUTION INTRAMUSCULAR; INTRAVENOUS; SUBCUTANEOUS at 13:53

## 2022-08-05 RX ADMIN — IOPAMIDOL 15 ML: 612 INJECTION, SOLUTION INTRATHECAL at 13:51

## 2022-08-05 RX ADMIN — GADOBENATE DIMEGLUMINE 5 ML: 529 INJECTION, SOLUTION INTRAVENOUS at 13:53

## 2022-08-16 ENCOUNTER — OFFICE VISIT (OUTPATIENT)
Dept: ORTHOPEDIC SURGERY | Facility: CLINIC | Age: 47
End: 2022-08-16

## 2022-08-16 VITALS — HEART RATE: 75 BPM | OXYGEN SATURATION: 98 % | WEIGHT: 191 LBS | BODY MASS INDEX: 28.95 KG/M2 | HEIGHT: 68 IN

## 2022-08-16 DIAGNOSIS — M75.112 INCOMPLETE TEAR OF LEFT ROTATOR CUFF, UNSPECIFIED WHETHER TRAUMATIC: ICD-10-CM

## 2022-08-16 DIAGNOSIS — S43.432D TEAR OF LEFT GLENOID LABRUM, SUBSEQUENT ENCOUNTER: Primary | ICD-10-CM

## 2022-08-16 DIAGNOSIS — M75.52 ACUTE SHOULDER BURSITIS, LEFT: ICD-10-CM

## 2022-08-16 PROCEDURE — 99213 OFFICE O/P EST LOW 20 MIN: CPT | Performed by: ORTHOPAEDIC SURGERY

## 2022-08-16 NOTE — PROGRESS NOTES
"Chief Complaint  Pain and Follow-up of the Left Shoulder     Subjective      Brendon Melara presents to [facility] for follow up evaluation of the left shoulder. The patient recently had an MRI and is here today for those results. To review, The patient thinks he tore something weight lifting about 3 months ago. He has had shoulder pain for years. He reports he has a sharp pain to the shoulder now. He has no other complaints. He takes meloxicam.     No Known Allergies     Social History     Socioeconomic History   • Marital status:    Tobacco Use   • Smoking status: Former Smoker     Packs/day: 1.00     Years: 5.00     Pack years: 5.00     Quit date: 1/1/2007     Years since quitting: 15.6   • Smokeless tobacco: Former User     Quit date: 2007   Vaping Use   • Vaping Use: Never used        Review of Systems     Objective   Vital Signs:   Pulse 75   Ht 172.7 cm (68\")   Wt 86.6 kg (191 lb)   SpO2 98%   BMI 29.04 kg/m²       Physical Exam  Constitutional:       Appearance: Normal appearance. The patient is well-developed and normal weight.   HENT:      Head: Normocephalic.      Right Ear: Hearing and external ear normal.      Left Ear: Hearing and external ear normal.      Nose: Nose normal.   Eyes:      Conjunctiva/sclera: Conjunctivae normal.   Cardiovascular:      Rate and Rhythm: Normal rate.   Pulmonary:      Effort: Pulmonary effort is normal.      Breath sounds: No wheezing or rales.   Abdominal:      Palpations: Abdomen is soft.      Tenderness: There is no abdominal tenderness.   Musculoskeletal:      Cervical back: Normal range of motion.   Skin:     Findings: No rash.   Neurological:      Mental Status: The patient is alert and oriented to person, place, and time.   Psychiatric:         Mood and Affect: Mood and affect normal.         Judgment: Judgment normal.       Ortho Exam      Left shoulder- non-tender. 4+ supraspinatus strength, negative impingement signs. " Negative cross arm adduction. 4+ infraspinatus, pain with resisted External Rotation. 5/5 subscapularis and supraspinatus strength. Positive O'tesha    Procedures      Imaging Results (Most Recent)     None           Result Review :       MRI Shoulder Left Arthrogram    Result Date: 8/5/2022  Narrative: PROCEDURE: MRI SHOULDER LEFT ARTHROGRAM  COMPARISON: [facility], CR, XR SHOULDER 2+ VW LEFT, 6/21/2022, 13:10.  INDICATIONS: left shoulder pain      TECHNIQUE: A variety of imaging planes and parameters were utilized for visualization of suspected pathology.  Images were performed after intra-articular injection of a mixture of non-ionic contrast and 0.1 cc of gadolinium contrast material.  The injection procedure is described in a separate report.   FINDINGS:  No fracture or focal osseous lesion is identified.  There is approximately 0.5 cm posterior subluxation of the humeral head.  Early acromioclavicular osteoarthrosis is noted.  No AC joint effusion is present.  A type 1 acromion is present.  A small amount of fluid is noted in the subdeltoid/subacromial bursa.  There is relatively mild articular surface fraying of the supraspinatus and infra spinatus tendons.  There is an approximately 50% thickness bursal surface tear at the junction of the supraspinatus and infra spinatus tendons at the footprint.  The rotator cuff otherwise appears unremarkable.  No muscle body atrophy is seen.  The biceps long head tendon and its attachment to the superior labrum are intact.  There is a tear/detachment of the entire posterior labrum.  There is detachment of the anteroinferior labrum.  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  The labrum otherwise appears unremarkable.  The glenohumeral ligaments are intact.  No loose body is seen.  Cartilage in the joint is intact.      Impression:   1. Tears of the anteroinferior and posterior labrum, as above 2. Mild posterior  decentering of the humeral head 3. Mild subdeltoid/subacromial bursitis 4. Partial thickness bursal surface tear at the conjoined supraspinatus/infra spinatus tendon distally     Jose Key M.D.       Electronically Signed and Approved By: Jose Key M.D. on 8/05/2022 at 16:30             FL Contrast Injection CT / MRI    Result Date: 8/5/2022  Narrative: PROCEDURE: FL CONTRAST INJECTION CT/MRI  COMPARISON: None  INDICATIONS: left shoulder pain.4 ML-ISOVUE M 300.10 ML-SODIUM CHLORIDE 0.9%.0.1 ML-MULTIHANCE. FLUORO TIME-LESS THAN .1 MINUTES. FLUORO IMAGES-3. mGy-3.1  CONSENT:   Prior to the procedure, the risks, benefits and alternatives were thoroughly explained.  This included the risk of bleeding, infection, and injury to associated structures.  Also the risk of allergic reaction was explained and the possibility of doing the procedure without intrarticular contrast.  The patient expressed understanding and wished to continue.  Informed written consent was obtained.   PROCEDURE:   The skin overlying the anterior aspect of the left shoulder joint was prepped and draped in normal sterile fashion.  Lidocaine was injected along the anticipated tract of the needle.  A 22 gauge spinal needle was advanced into the left glenohumeral joint under fluoroscopic guidance.  14 cc of mixture containing 13 cc sterile saline, 1 cc iodinated contrast and 0.1 cc gadolinium was injected.  The needle was withdrawn. The patient demonstrated no complication and was transferred to MRI.   Less than 0.1 minutes fluoroscopy time was utilized.       Impression:  Successful injection of a dilute mixture of gadolinium into the left shoulder without evidence of complication.  Please refer to separate MRI report.      YULY HINTON MD       Electronically Signed and Approved By: YULY HINTON MD on 8/05/2022 at 14:50                      Assessment and Plan     Diagnoses and all orders for this visit:    1. Tear of left glenoid  labrum, subsequent encounter (Primary)    2. Acute shoulder bursitis, left    3. Incomplete tear of left rotator cuff, unspecified whether traumatic        Discussed the treatment plan with the patient.  I reviewed the MRI with the patient. Plan for conservative treatment at this time. Order for physical therapy given today.     Call or return if worsening symptoms.    Follow Up     6 weeks      Patient was given instructions and counseling regarding his condition or for health maintenance advice. Please see specific information pulled into the AVS if appropriate.     Scribed for Cayetano Butler MD by Brianne Napoles.  08/16/22   08:35 EDT    I have personally performed the services described in this document as scribed by the above individual and it is both accurate and complete. Cayetano Butler MD 08/16/22

## 2022-08-19 ENCOUNTER — TELEPHONE (OUTPATIENT)
Dept: ORTHOPEDIC SURGERY | Facility: CLINIC | Age: 47
End: 2022-08-19

## 2022-08-19 DIAGNOSIS — S43.432D TEAR OF LEFT GLENOID LABRUM, SUBSEQUENT ENCOUNTER: Primary | ICD-10-CM

## 2022-08-24 ENCOUNTER — TREATMENT (OUTPATIENT)
Dept: PHYSICAL THERAPY | Facility: CLINIC | Age: 47
End: 2022-08-24

## 2022-08-24 DIAGNOSIS — M25.512 ACUTE PAIN OF LEFT SHOULDER: ICD-10-CM

## 2022-08-24 DIAGNOSIS — S43.432D TEAR OF LEFT GLENOID LABRUM, SUBSEQUENT ENCOUNTER: Primary | ICD-10-CM

## 2022-08-24 PROCEDURE — G0283 ELEC STIM OTHER THAN WOUND: HCPCS | Performed by: PHYSICAL THERAPIST

## 2022-08-24 PROCEDURE — 97161 PT EVAL LOW COMPLEX 20 MIN: CPT | Performed by: PHYSICAL THERAPIST

## 2022-08-24 PROCEDURE — 97110 THERAPEUTIC EXERCISES: CPT | Performed by: PHYSICAL THERAPIST

## 2022-08-24 NOTE — PROGRESS NOTES
Physical Therapy Initial Evaluation and Plan of Care    Patient: Brendon Melara   : 1975  Diagnosis/ICD-10 Code:  Tear of left glenoid labrum, subsequent encounter [S43.432D]  Referring practitioner: Cayetano Butler MD  Date of Initial Visit: 2022  Today's Date: 2022  Patient seen for 1 sessions           Subjective Questionnaire: QuickDASH: 27% impairment       Subjective Evaluation    History of Present Illness  Mechanism of injury: Pt reports that he has been having pain in the L shoulder. Had MRI which pt reports showed he has a tear in his labrum and head of humerus is not aligned in the socket correctly. Pain began ~4 months ago. Not sure of anything specific that caused pain, maybe just increased weight lifting. Pt states that he has injured this shoulder in the past. First time was ~10 years ago. Never got it checked out, it just became tolerable to where he was able to weight lift and do normal tasks. No numbness or tingling. Reaching out to the side is painful. Resisted motion causes more pain. Painful to perform a chest press. He hasn't been doing chest press, but able to do push-ups and it doesn't bother him any. OH press causes pain. Also can't do bar squats due to pain when trying to reach back to the bar behind him. Unable to hit the heavy bag either. 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 Difficult to slide card in  at the office because of the outward reach. Pain does wake him up some if sleeping on the L side.     Pain  Current pain ratin  At best pain ratin  At worst pain ratin  Quality: dull ache and sharp  Relieving factors: change in position, rest and medications (OTC pain medication as needed)  Aggravating factors: overhead activity, lifting, sleeping and outstretched reach    Diagnostic Tests  MRI studies: abnormal (See imaging)    Patient Goals  Patient goals for therapy: decreased pain, increased motion, return to sport/leisure  activities and increased strength          No past medical history on file.      Objective          Postural Observations  Seated posture: fair  Standing posture: good        Palpation   Left   No palpable tenderness to the infraspinatus, supraspinatus and upper trapezius.   Tenderness of the anterior deltoid and biceps.     Tenderness     Left Shoulder   Tenderness in the biceps tendon (proximal) and bicipital groove.     Cervical/Thoracic Screen   Cervical range of motion within normal limits    Neurological Testing     Sensation     Shoulder   Left Shoulder   Intact: light touch    Right Shoulder   Intact: light touch    Active Range of Motion   Left Shoulder   Flexion: WFL and with pain  Abduction: WFL and with pain  External rotation 45°: WFL  Internal rotation 45°: 75 degrees with pain  Horizontal adduction: WFL    Right Shoulder   Normal active range of motion    Additional Active Range of Motion Details  Measurements reported as percentage of normal ROM.      Scapular Mobility   Left Shoulder   Scapular mobility: fair  Scapular Mobility with Shoulder to 90° FF   Scapular elevation: minimal  Upward rotation: delayed    Strength/Myotome Testing     Left Shoulder     Planes of Motion   Flexion: 4+   Extension: 5   Abduction: 4   External rotation at 0°: 4   Internal rotation at 0°: 5     Isolated Muscles   Lower trapezius: 4   Middle trapezius: 4     Right Shoulder   Normal muscle strength    Isolated Muscles   Lower trapezius: 4+   Middle trapezius: 4+     Additional Strength Details  Strength deficits in the L UE are most likely due to pain     Tests     Left Shoulder   Positive crank and Hawkin's.   Negative painful arc.           Assessment & Plan     Assessment  Impairments: abnormal muscle firing, abnormal muscle tone, abnormal or restricted ROM, activity intolerance, impaired physical strength, lacks appropriate home exercise program and pain with function  Functional Limitations: carrying objects,  "lifting, sleeping, pulling, pushing, uncomfortable because of pain, reaching behind back and reaching overhead  Assessment details: Pt is a 47 year old male with c/o L shoulder pain. MRI results show \"tears of the anteroinferior and posterior labrum, mild posterior decentering of the humeral head, mild subdeltoid/subacromial bursitis, partial thickness bursal surface tear at the conjoined supraspinatus/infra spinatus tendon distally\". Pt demonstrates some weakness in scapular stabilizers on the R with some scapular dysfunction with OH movement as well. Pt also displays some weakness in the RTC on the L compared to R, most likely due to pain. Min instability in the shoulder noted. Functional limitations are listed above. Pt will benefit from PT in order to address impairments and improve overall function.   Prognosis: good    Goals  Plan Goals: STG (3 weeks):  Pt will demonstrate increased activation of scap stabilizers during supine and seated activities/exercises to decrease load on spine.   Pt will display improved ROM of L shoulder and scapula to WFL with min to no pain to assist with functional tasks.     LTG (6 weeks):  Pt will be independent with home exercises to assist with improved strength and continue to improve function.   Pt will demonstrate decreased score on the QuickDASH to 12% to demonstrate decreased overall impairment.   Pt will be able to perform OH tasks, including lifting with little to no pain as well as tasks reaching out to the side with little to no pain.   Pt will demonstrate improved shoulder and scapular strength to 5/5 overall to assist with function.      Plan  Therapy options: will be seen for skilled therapy services  Planned modality interventions: cryotherapy, TENS, thermotherapy (hydrocollator packs) and electrical stimulation/Russian stimulation  Planned therapy interventions: ADL retraining, balance/weight-bearing training, body mechanics training, flexibility, functional ROM " exercises, home exercise program, joint mobilization, manual therapy, motor coordination training, neuromuscular re-education, postural training, soft tissue mobilization, spinal/joint mobilization, strengthening, stretching and therapeutic activities  Frequency: 2x week  Duration in weeks: 6  Treatment plan discussed with: patient        History # of Personal Factors and/or Comorbidities: LOW (0)  Examination of Body System(s): # of elements: LOW (1-2)  Clinical Presentation: STABLE   Clinical Decision Making: LOW       Eval:  Low Eval     25     Mins  28791  Mod Eval          Mins  76022  High Eval                            Mins  58058    Timed:         Manual Therapy:         mins  14379;     Therapeutic Exercise:    15     mins  98823;     Neuromuscular Huseyin:        mins  01795;    Therapeutic Activity:          mins  63399;     Gait Training:           mins  19900;       Un-Timed:  Electrical Stimulation:    15     mins  93281 ( );  Traction          mins 78565      Timed Treatment:   15   mins   Total Treatment:     55   mins    PT SIGNATURE: Joslyn Rajput PT, DPT, OCS           [place] License # [id number]           [place] License # [id number]    DATE TREATMENT INITIATED: 8/24/2022    Initial Certification  Certification Period: 11/22/2022  I certify that the therapy services are furnished while this patient is under my care.  The services outlined above are required by this patient, and will be reviewed every 90 days.     PHYSICIAN: Cayetano Butler MD      DATE:     Please sign and return via fax to [phone].. Thank you, [facility].

## 2022-08-29 ENCOUNTER — TREATMENT (OUTPATIENT)
Dept: PHYSICAL THERAPY | Facility: CLINIC | Age: 47
End: 2022-08-29

## 2022-08-29 DIAGNOSIS — M25.512 ACUTE PAIN OF LEFT SHOULDER: ICD-10-CM

## 2022-08-29 DIAGNOSIS — S43.432D TEAR OF LEFT GLENOID LABRUM, SUBSEQUENT ENCOUNTER: Primary | ICD-10-CM

## 2022-08-29 PROCEDURE — 97110 THERAPEUTIC EXERCISES: CPT | Performed by: PHYSICAL THERAPIST

## 2022-08-29 PROCEDURE — G0283 ELEC STIM OTHER THAN WOUND: HCPCS | Performed by: PHYSICAL THERAPIST

## 2022-08-29 PROCEDURE — 97140 MANUAL THERAPY 1/> REGIONS: CPT | Performed by: PHYSICAL THERAPIST

## 2022-08-29 NOTE — PROGRESS NOTES
Physical Therapy Daily Note    Patient: Brendon Melara   : 1975  Diagnosis/ICD-10 Code:  Tear of left glenoid labrum, subsequent encounter [S43.432D]  Referring practitioner: Cayetano Butler MD  Date of Initial Visit: 2022  Today's Date: 2022  Patient seen for 2 sessions                                                                                                                                                                                                                                                                                                                               VISIT#: 2/  sessions authorized per POC (Recert due 2022)     Precautions/Restrictions: None     Subjective  Brendon Melara reports: that his shoulder felt fine after last session. Doing the exercises at home, but didn't have a band to do the rows.       Objective  Min tenderness anterior shoulder at prox biceps   Min fwd shoulders posture    See Exercise, Manual, and Modality Logs for complete treatment.     Home Exercises  No money with GTB    Assessment/Plan   Pt demonstrates good progress with scap stabilization during exercises with min cuing required. More discomfort noted with ER, however pt able to perform in modified range for strengthening without increased pain. Modalities performed at end of session without adverse effects.     Goals  STG (3 weeks):  Pt will demonstrate increased activation of scap stabilizers during supine and seated activities/exercises to decrease load on spine.   Pt will display improved ROM of L shoulder and scapula to WFL with min to no pain to assist with functional tasks.     LTG (6 weeks):  Pt will be independent with home exercises to assist with improved strength and continue to improve function.   Pt will demonstrate decreased score on the QuickDASH to 12% to demonstrate decreased overall impairment.   Pt will be able to perform OH tasks, including lifting with little  to no pain as well as tasks reaching out to the side with little to no pain.   Pt will demonstrate improved shoulder and scapular strength to 5/5 overall to assist with function.      Progress per Plan of Care and Progress strengthening /stabilization /functional activity            Timed:         Manual Therapy:    15     mins  12224;     Therapeutic Exercise:    15     mins  41284;     Neuromuscular Huseyin:        mins  77413;    Therapeutic Activity:          mins  44809;     Gait Training:           mins  31138;     Ultrasound:          mins  13326;    Ionto                                   mins   11796  Self Care                            mins   27055    Un-Timed:  Electrical Stimulation:    15     mins  23459 ( );  Traction          mins 37466    Timed Treatment:   30   mins   Total Treatment:     45   mins    Joslyn Rajput, PT, DPT, OCS     [place] License # [id number]     [place] License # [id number]

## 2022-09-13 ENCOUNTER — TREATMENT (OUTPATIENT)
Dept: PHYSICAL THERAPY | Facility: CLINIC | Age: 47
End: 2022-09-13

## 2022-09-13 DIAGNOSIS — S43.432D TEAR OF LEFT GLENOID LABRUM, SUBSEQUENT ENCOUNTER: Primary | ICD-10-CM

## 2022-09-13 DIAGNOSIS — M25.512 ACUTE PAIN OF LEFT SHOULDER: ICD-10-CM

## 2022-09-13 PROCEDURE — 97110 THERAPEUTIC EXERCISES: CPT | Performed by: PHYSICAL THERAPIST

## 2022-09-13 PROCEDURE — 97140 MANUAL THERAPY 1/> REGIONS: CPT | Performed by: PHYSICAL THERAPIST

## 2022-09-13 NOTE — PROGRESS NOTES
Physical Therapy Daily Note    Patient: Brendon Melara   : 1975  Diagnosis/ICD-10 Code:  Tear of left glenoid labrum, subsequent encounter [S43.432D]  Referring practitioner: Cayetano Butler MD  Date of Initial Visit: 2022  Today's Date: 2022  Patient seen for 3 sessions                                                                                                                                                                                                                                                                                                                               VISIT#: 3/ 12 sessions authorized per POC (Recert due 2022)     Precautions/Restrictions: None     Subjective  Brendon Melara reports that he is doing well. Still having some pain when he reaches out to the side and has trouble with putting deodorant as well. Does feel like the pain might be a little less. Feels like it's ~4/10 when he reaches out to the side.       Objective  Min tenderness anterior shoulder at prox biceps   Min fwd shoulders posture    See Exercise, Manual, and Modality Logs for complete treatment.     Home Exercises:  Progress serratus punches and chest press to 4#     Assessment/Plan   Pt demonstrates good progress with scap stabilization during exercises. Still some discomfort with end range ER, however is improving overall as well. Pt displays good technique with exercises with min cuing to proper scap stabilization. Pt is progressing well overall.     Goals  STG (3 weeks):  Pt will demonstrate increased activation of scap stabilizers during supine and seated activities/exercises to decrease load on spine.   Pt will display improved ROM of L shoulder and scapula to WFL with min to no pain to assist with functional tasks.     LTG (6 weeks):  Pt will be independent with home exercises to assist with improved strength and continue to improve function.   Pt will demonstrate decreased score on  the QuickDASH to 12% to demonstrate decreased overall impairment.   Pt will be able to perform OH tasks, including lifting with little to no pain as well as tasks reaching out to the side with little to no pain.   Pt will demonstrate improved shoulder and scapular strength to 5/5 overall to assist with function.      Progress per Plan of Care and Progress strengthening /stabilization /functional activity            Timed:         Manual Therapy:    15     mins  63392;     Therapeutic Exercise:    20     mins  95372;     Neuromuscular Huseyin:        mins  47174;    Therapeutic Activity:          mins  91967;     Gait Training:           mins  29543;     Ultrasound:          mins  04494;    Ionto                                   mins   68192  Self Care                            mins   55885    Un-Timed:  Electrical Stimulation:         mins  14129 ( );  Traction          mins 99587    Timed Treatment:   35   mins   Total Treatment:     35   mins    Joslyn Rajput, PT, DPT, OCS     [place] License # [id number]     [place] License # [id number]

## 2022-09-20 ENCOUNTER — TREATMENT (OUTPATIENT)
Dept: PHYSICAL THERAPY | Facility: CLINIC | Age: 47
End: 2022-09-20

## 2022-09-20 DIAGNOSIS — M25.512 ACUTE PAIN OF LEFT SHOULDER: ICD-10-CM

## 2022-09-20 DIAGNOSIS — S43.432D TEAR OF LEFT GLENOID LABRUM, SUBSEQUENT ENCOUNTER: Primary | ICD-10-CM

## 2022-09-20 PROCEDURE — 97140 MANUAL THERAPY 1/> REGIONS: CPT | Performed by: PHYSICAL THERAPIST

## 2022-09-20 PROCEDURE — 97110 THERAPEUTIC EXERCISES: CPT | Performed by: PHYSICAL THERAPIST

## 2022-09-20 PROCEDURE — G0283 ELEC STIM OTHER THAN WOUND: HCPCS | Performed by: PHYSICAL THERAPIST

## 2022-09-20 NOTE — PROGRESS NOTES
Physical Therapy Daily Note    Patient: Brendon Melara   : 1975  Diagnosis/ICD-10 Code:  Tear of left glenoid labrum, subsequent encounter [S43.432D]  Referring practitioner: Cayetano Butler MD  Date of Initial Visit: 2022  Today's Date: 2022  Patient seen for 4 sessions                                                                                                                                                                                                                                                                                                                               VISIT#: 4/ 12 sessions authorized per POC (Recert due 2022)     Precautions/Restrictions: None     Subjective  Brendon Melara reports that the shoulder is getting better. ROM is improving. Feels like he can reach out to the side a little better and it's easier to get arm in/out of shirt.       Objective  Min tenderness anterior shoulder at prox biceps   Min fwd shoulders posture    See Exercise, Manual, and Modality Logs for complete treatment.     Home Exercises:  Progress serratus punches and chest press to 4#     Assessment/Plan   Pt demonstrates good progress with scap stabilization/strength. Able to perform exercises well with mod fatigue overall. Pt is progressing well with strength and ROM. Performed e-stim in session today because pt does not have ability to perform IFC at home.      Goals  STG (3 weeks):  Pt will demonstrate increased activation of scap stabilizers during supine and seated activities/exercises to decrease load on shoulder. - progressing  Pt will display improved ROM of L shoulder and scapula to WFL with min to no pain to assist with functional tasks. - progressing    LTG (6 weeks):  Pt will be independent with home exercises to assist with improved strength and continue to improve function. - met for current program  Pt will demonstrate decreased score on the QuickDASH to 12% to  demonstrate decreased overall impairment.   Pt will be able to perform OH tasks, including lifting with little to no pain as well as tasks reaching out to the side with little to no pain. - progressing  Pt will demonstrate improved shoulder and scapular strength to 5/5 overall to assist with function.  - progressing     Progress per Plan of Care and Progress strengthening /stabilization /functional activity            Timed:         Manual Therapy:    15     mins  56110;     Therapeutic Exercise:    20     mins  48982;     Neuromuscular Huseyin:        mins  83013;    Therapeutic Activity:          mins  86997;     Gait Training:           mins  03948;     Ultrasound:          mins  94511;    Ionto                                   mins   00306  Self Care                            mins   32985    Un-Timed:  Electrical Stimulation:   15      mins  38083 ( );  Traction          mins 43097    Timed Treatment:   35   mins   Total Treatment:     50   mins    Joslyn Rajput PT, DPT, OCS     [place] License # [id number]     [place] License # [id number]

## 2022-09-22 ENCOUNTER — TREATMENT (OUTPATIENT)
Dept: PHYSICAL THERAPY | Facility: CLINIC | Age: 47
End: 2022-09-22

## 2022-09-22 DIAGNOSIS — S43.432D TEAR OF LEFT GLENOID LABRUM, SUBSEQUENT ENCOUNTER: Primary | ICD-10-CM

## 2022-09-22 DIAGNOSIS — M25.512 ACUTE PAIN OF LEFT SHOULDER: ICD-10-CM

## 2022-09-22 PROCEDURE — 97140 MANUAL THERAPY 1/> REGIONS: CPT | Performed by: PHYSICAL THERAPIST

## 2022-09-22 PROCEDURE — 97110 THERAPEUTIC EXERCISES: CPT | Performed by: PHYSICAL THERAPIST

## 2022-09-22 NOTE — PATIENT INSTRUCTIONS
Access Code: [email or web address]  URL: [email or web address]  Date: 09/22/2022  Prepared by: Joslyn Rajput    Exercises  Shoulder PNF D2 with Resistance - 3 x weekly - 2 sets - 15 reps - 3 sec hold  Standing Single Arm Shoulder PNF D1 Flexion with Anchored Resistance - 3 x weekly - 2 sets - 15 reps - 3 sec hold  Standing Shoulder Single Arm PNF D2 Extension with Anchored Resistance - 3 x weekly - 2 sets - 15 reps - 3 sec hold  Standing Single Arm Shoulder PNF D1 Extension with Anchored Resistance - 3 x weekly - 2 sets - 15 reps - 3 sec hold

## 2022-09-22 NOTE — PROGRESS NOTES
Physical Therapy Daily Note    Patient: Brendon Melara   : 1975  Diagnosis/ICD-10 Code:  Tear of left glenoid labrum, subsequent encounter [S43.432D]  Referring practitioner: Cayetano Butler MD  Date of Initial Visit: 2022  Today's Date: 2022  Patient seen for 5 sessions                                                                                                                                                                                                                                                                                                                               VISIT#: 5/ 12 sessions authorized per POC (Recert due 2022)     Precautions/Restrictions: None     Subjective  Brendon Melara reports that his shoulder is still doing better. No issues after last session.       Objective  No tenderness anterior shoulder at prox biceps   Min fwd shoulders posture    See Exercise, Manual, and Modality Logs for complete treatment.     Home Exercises:  Access Code: [email or web address]  URL: [email or web address]  Date: 2022  Prepared by: Joslyn Rajput    Exercises  Shoulder PNF D2 with Resistance - 3 x weekly - 2 sets - 15 reps - 3 sec hold  Standing Single Arm Shoulder PNF D1 Flexion with Anchored Resistance - 3 x weekly - 2 sets - 15 reps - 3 sec hold  Standing Shoulder Single Arm PNF D2 Extension with Anchored Resistance - 3 x weekly - 2 sets - 15 reps - 3 sec hold  Standing Single Arm Shoulder PNF D1 Extension with Anchored Resistance - 3 x weekly - 2 sets - 15 reps - 3 sec hold      Assessment/Plan   Pt demonstrates good progress with progression of strengthening. Focused more on functional movements with PNF patterns this date and pt able to perform well without increased pain. Also focused on posture during resistance training as well as stretching of anterior shoulder/pecs for improved posture as well. Pt is progressing well overall.     Goals  STG (3 weeks):  Pt will  demonstrate increased activation of scap stabilizers during supine and seated activities/exercises to decrease load on shoulder. - progressing  Pt will display improved ROM of L shoulder and scapula to WFL with min to no pain to assist with functional tasks. - progressing    LTG (6 weeks):  Pt will be independent with home exercises to assist with improved strength and continue to improve function. - met for current program  Pt will demonstrate decreased score on the QuickDASH to 12% to demonstrate decreased overall impairment.   Pt will be able to perform OH tasks, including lifting with little to no pain as well as tasks reaching out to the side with little to no pain. - progressing  Pt will demonstrate improved shoulder and scapular strength to 5/5 overall to assist with function.  - progressing     Progress per Plan of Care and Progress strengthening /stabilization /functional activity            Timed:         Manual Therapy:    15     mins  74311;     Therapeutic Exercise:    25     mins  81095;     Neuromuscular Huseyin:        mins  22795;    Therapeutic Activity:          mins  46682;     Gait Training:           mins  63431;     Ultrasound:          mins  36838;    Ionto                                   mins   17092  Self Care                            mins   73532    Un-Timed:  Electrical Stimulation:         mins  03448 ( );  Traction          mins 67309    Timed Treatment:   40   mins   Total Treatment:     40   mins    Joslyn Rajput, PT, DPT, OCS     [place] License # [id number]     [place] License # [id number]

## 2022-09-27 ENCOUNTER — TREATMENT (OUTPATIENT)
Dept: PHYSICAL THERAPY | Facility: CLINIC | Age: 47
End: 2022-09-27

## 2022-09-27 DIAGNOSIS — S43.432D TEAR OF LEFT GLENOID LABRUM, SUBSEQUENT ENCOUNTER: Primary | ICD-10-CM

## 2022-09-27 DIAGNOSIS — M25.512 ACUTE PAIN OF LEFT SHOULDER: ICD-10-CM

## 2022-09-27 PROCEDURE — 97110 THERAPEUTIC EXERCISES: CPT | Performed by: PHYSICAL THERAPIST

## 2022-09-27 PROCEDURE — 97140 MANUAL THERAPY 1/> REGIONS: CPT | Performed by: PHYSICAL THERAPIST

## 2022-09-27 NOTE — PROGRESS NOTES
Physical Therapy Daily Note    Patient: Brendon Melara   : 1975  Diagnosis/ICD-10 Code:  Tear of left glenoid labrum, subsequent encounter [S43.432D]  Referring practitioner: Cayetano Butler MD  Date of Initial Visit: 2022  Today's Date: 2022  Patient seen for 6 sessions                                                                                                                                                                                                                                                                                                                               VISIT#: 6 sessions authorized per POC (Recert due 2022)     Precautions/Restrictions: None     Subjective  Brendon Melara reports that he felt fine after last session, however had increased pain in the shoulder after doing pull-ups over the weekend. Thinks he just stretched too far down while doing the exercise. Exercises from PT are going well at home, except the pec stretch in the doorway. He only did it one more time at home but bothering him both times that he did it. Shoulder is feeling better but just sore from the pull-ups still.       Objective  Min tenderness anterior shoulder at prox biceps     See Exercise, Manual, and Modality Logs for complete treatment.     Home Exercises:  No new home exercises       Assessment/Plan   Pt demonstrates increased muscle fatigue with progression to prone T's. No pain during the exercise, however did note some increased soreness afterwards. Able to perform PNF exercises well without pain. Pt instructed to hold on pec stretch and wait to begin the prone T's at home until shoulder is feeling better.     Goals  STG (3 weeks):  Pt will demonstrate increased activation of scap stabilizers during supine and seated activities/exercises to decrease load on shoulder. - progressing  Pt will display improved ROM of L shoulder and scapula to WFL with min to no pain to assist  with functional tasks. - progressing    LTG (6 weeks):  Pt will be independent with home exercises to assist with improved strength and continue to improve function. - met for current program  Pt will demonstrate decreased score on the QuickDASH to 12% to demonstrate decreased overall impairment.   Pt will be able to perform OH tasks, including lifting with little to no pain as well as tasks reaching out to the side with little to no pain. - progressing  Pt will demonstrate improved shoulder and scapular strength to 5/5 overall to assist with function.  - progressing     Progress per Plan of Care and Progress strengthening /stabilization /functional activity            Timed:         Manual Therapy:    15     mins  73768;     Therapeutic Exercise:    25     mins  63991;     Neuromuscular Huseyin:        mins  52598;    Therapeutic Activity:          mins  34876;     Gait Training:           mins  38804;     Ultrasound:          mins  74414;    Ionto                                   mins   18870  Self Care                            mins   21260    Un-Timed:  Electrical Stimulation:         mins  78254 ( );  Traction          mins 39637    Timed Treatment:   40   mins   Total Treatment:     40   mins    Joslyn Rajput, PT, DPT, OCS     [place] License # [id number]     [place] License # [id number]

## 2022-09-29 ENCOUNTER — TREATMENT (OUTPATIENT)
Dept: PHYSICAL THERAPY | Facility: CLINIC | Age: 47
End: 2022-09-29

## 2022-09-29 DIAGNOSIS — S43.432D TEAR OF LEFT GLENOID LABRUM, SUBSEQUENT ENCOUNTER: Primary | ICD-10-CM

## 2022-09-29 DIAGNOSIS — M25.512 ACUTE PAIN OF LEFT SHOULDER: ICD-10-CM

## 2022-09-29 PROCEDURE — 97110 THERAPEUTIC EXERCISES: CPT | Performed by: PHYSICAL THERAPIST

## 2022-09-29 NOTE — PROGRESS NOTES
Re-Assessment/Progress Note        Patient: Brendon Melara   : 1975  Diagnosis/ICD-10 Code:  Tear of left glenoid labrum, subsequent encounter [S43.432D]  Referring practitioner: Cayetano Butler MD  Date of Initial Visit: Type: THERAPY  Noted: 2022  Today's Date: 2022  Patient seen for 7 sessions      Subjective:   Brendon Melara reports that he is doing better. Pain is better than last session. He was able to do pull-ups this morning without increased pain. Just made sure to not overextend the shoulder. Still has pain when trying to weight-bear through the L arm to lower onto stomach.       Subjective Questionnaire: QuickDASH: 16% impairment   Clinical Progress: improved  Home Program Compliance: Yes  Treatment has included: therapeutic exercise, neuromuscular re-education, manual therapy, therapeutic activity, electrical stimulation, moist heat and cryotherapy    Objective          Active Range of Motion   Left Shoulder   Flexion: 165 degrees   Abduction: 177 degrees   External rotation 90°: 96 degrees   Internal rotation 45°: 80 (in supine) degrees   Internal rotation 90°: 15 (in sitting) degrees with pain    Additional Active Range of Motion Details  Functional IR/extension to reach behind back: WNL B     Scapular Mobility   Left Shoulder   Scapular mobility: WFL    Right Shoulder   Scapular mobility: WFL    Strength/Myotome Testing     Left Shoulder   Normal muscle strength    Right Shoulder   Normal muscle strength    Additional Strength Details  Min decreased strength with L mid trap: 4-/5 with pain with positioning       Assessment/Plan   Pt demonstrates good progress with shoulder mobility and strength. Improved ROM to WNL except for IR at 90 degrees of abd due to pain. Pt able to perform functional IR without pain or decreased ROM. Pt displays improved scapular strength B, however still limited on the L mid trap. Pt demonstrates improved activation of serratus anterior and scap  stabilizers during ROM/activtiies with the shoulder. Pt has met most goals and is independent with HEP. Pt will be discharged at this time.     Progress toward previous goals: Partially Met    Goals  STG (3 weeks):  Pt will demonstrate increased activation of scap stabilizers during supine and seated activities/exercises to decrease load on shoulder. - met  Pt will display improved ROM of L shoulder and scapula to WFL with min to no pain to assist with functional tasks. - met    LTG (6 weeks):  Pt will be independent with home exercises to assist with improved strength and continue to improve function. - met  Pt will demonstrate decreased score on the QuickDASH to 12% to demonstrate decreased overall impairment. - mostly met  Pt will be able to perform OH tasks, including lifting with little to no pain as well as tasks reaching out to the side with little to no pain. - mostly met  Pt will demonstrate improved shoulder and scapular strength to 5/5 overall to assist with function.  - mostly met      Recommendations: Discharge with HEP     PT Signature: Joslyn Rajput, PT, DPT, OCS     [place] License # [id number]    [place] License # [id number]      Timed:         Manual Therapy:         mins  67702;     Therapeutic Exercise:    15     mins  94886;     Neuromuscular Huseyin:        mins  79025;    Therapeutic Activity:          mins  02216;     Gait Training:           mins  35847;     Ultrasound:          mins  94191;    Ionto                                   mins   66502  Self Care                            mins   69745    Un-Timed:  Electrical Stimulation:         mins  88523 ( );  Traction          mins 10665  Re-Eval                               mins  56136      Timed Treatment:   15   mins   Total Treatment:     15   mins

## 2022-11-10 ENCOUNTER — DOCUMENTATION (OUTPATIENT)
Dept: PHYSICAL THERAPY | Facility: CLINIC | Age: 47
End: 2022-11-10

## 2022-11-10 NOTE — PROGRESS NOTES
Discharge Summary  Discharge Summary from Physical Therapy Report      Date of Initial PT visit: 8/24/2022  Number of Visits Seen: 7     Discharge Status of Patient:   STG (3 weeks):  Pt will demonstrate increased activation of scap stabilizers during supine and seated activities/exercises to decrease load on shoulder. - met  Pt will display improved ROM of L shoulder and scapula to WFL with min to no pain to assist with functional tasks. - met    LTG (6 weeks):  Pt will be independent with home exercises to assist with improved strength and continue to improve function. - met  Pt will demonstrate decreased score on the QuickDASH to 12% to demonstrate decreased overall impairment. - mostly met  Pt will be able to perform OH tasks, including lifting with little to no pain as well as tasks reaching out to the side with little to no pain. - mostly met  Pt will demonstrate improved shoulder and scapular strength to 5/5 overall to assist with function.  - mostly met    Goals: Partially Met    Discharge Plan: Continue with current home exercise program as instructed    Comments: Pt demonstrates good progress with shoulder mobility and strength. Improved ROM to WNL except for IR at 90 degrees of abd due to pain. Pt able to perform functional IR without pain or decreased ROM. Pt displays improved scapular strength B, however still limited on the L mid trap. Pt demonstrates improved activation of serratus anterior and scap stabilizers during ROM/activtiies with the shoulder. Pt has met most goals and is independent with HEP. Pt will be discharged at this time.     Date of Discharge: 11/10/2022      Joslyn Rajput, PT, DPT, OCS     [place] License # [id number]     [place] License # [id number]

## 2023-02-16 ENCOUNTER — TELEPHONE (OUTPATIENT)
Dept: FAMILY MEDICINE CLINIC | Facility: CLINIC | Age: 48
End: 2023-02-16

## 2023-02-16 NOTE — TELEPHONE ENCOUNTER
Caller: Brendon Melara    Relationship to patient: Self    Best call back number: [phone]    Chief complaint: CHEST TIGHTNESS, SORE THROAT    Patient directed to call 911 or go to their nearest emergency room.     Patient verbalized understanding: [x] Yes  [] No  If no, why?    Additional notes:THE PATIENT STATED HE HAS HAD CHEST TIGHTNESS FOR SEVERAL DAYS. PATIENT STATED HE WILL BE GOING TO [facility] EMERGENCY ROOM

## 2023-02-17 NOTE — TELEPHONE ENCOUNTER
Patient went to urgent care in [place]. He was tested negative for all illnesses he was concerned.

## 2023-04-12 ENCOUNTER — OFFICE VISIT (OUTPATIENT)
Dept: FAMILY MEDICINE CLINIC | Facility: CLINIC | Age: 48
End: 2023-04-12
Payer: OTHER GOVERNMENT

## 2023-04-12 VITALS
HEART RATE: 100 BPM | DIASTOLIC BLOOD PRESSURE: 72 MMHG | SYSTOLIC BLOOD PRESSURE: 118 MMHG | HEIGHT: 68 IN | TEMPERATURE: 97.7 F | BODY MASS INDEX: 29.55 KG/M2 | WEIGHT: 195 LBS | OXYGEN SATURATION: 97 %

## 2023-04-12 DIAGNOSIS — M25.60 LIMITED JOINT RANGE OF MOTION: Primary | ICD-10-CM

## 2023-04-12 DIAGNOSIS — M54.2 CERVICAL PAIN (NECK): ICD-10-CM

## 2023-04-12 RX ORDER — TIZANIDINE 2 MG/1
2 TABLET ORAL EVERY 8 HOURS PRN
Qty: 30 TABLET | Refills: 0 | Status: SHIPPED | OUTPATIENT
Start: 2023-04-12

## 2023-04-12 RX ORDER — METHYLPREDNISOLONE 4 MG/1
TABLET ORAL
Qty: 21 EACH | Refills: 0 | Status: SHIPPED | OUTPATIENT
Start: 2023-04-12

## 2023-04-12 NOTE — PROGRESS NOTES
Please mail letter to patient and I will also do my chart message    Mr. Melara the radiologist read your x-rays as follows:  FINDINGS:          Prevertebral soft tissues normal.  Straightening of the cervical curvature.  Prominent degenerative disc disease at C6-C7 with dorsal and uncovertebral spurring.  Mild degenerative disc disease at C4-C5 and C5-C6.    IMPRESSION:               Degenerative disc disease most severe at C6-C7    And as we discussed in the visit should anything persist or worsen let me know and I will go ahead and proceed with the physical therapy referral

## 2023-04-12 NOTE — PROGRESS NOTES
Chief Complaint  Neck Pain (Muscled pulled in his neck and left shoulder area.  This has been going on for about a week.  This happened while doing pull ups. )    Subjective            Brendon Melara presents to [facility]  History of Present Illness  Pt reports 1.5 weeks ago was doing pull ups and then felt something pull or staring in the left side of the neck and then in to the to shoulder area--and then pt reports also known injury of the left shoulder with tears in the labrum and then saw Dr Butler and then did PT and improved and this pain is different that that and is in the neck area but that was approximately a year ago    Pt reports re-injured it this weekend--and was able to do ROM without pain then was doing sit-ups and then felt a sharp pain--pt reports the numbness and tingling he experiences--with both arms/hands is more at night and ongoing and is positional      And patient has been taking ibuprofen for the pain and actually reports had improved until he attempted to sit ups over the weekend          PHQ-2 Total Score: 0  PHQ-9 Total Score: 0    History reviewed. No pertinent past medical history.    No Known Allergies     History reviewed. No pertinent surgical history.     Social History     Tobacco Use   • Smoking status: Former     Packs/day: 1.00     Years: 5.00     Pack years: 5.00     Types: Cigarettes     Quit date: 2007     Years since quittin.2   • Smokeless tobacco: Former     Quit date:    Vaping Use   • Vaping Use: Never used       History reviewed. No pertinent family history.     Health Maintenance Due   Topic Date Due   • COVID-19 Vaccine (3 - Booster for Moderna series) 08/10/2021   • HEPATITIS C SCREENING  Never done   • ANNUAL PHYSICAL  2022        Current Outpatient Medications on File Prior to Visit   Medication Sig   • albuterol (ACCUNEB) 0.63 MG/3ML nebulizer solution albuterol sulfate 0.63 mg/3 mL inhalation solution  for nebulization use in nebulizer as directed   Active   • azelastine (ASTELIN) 0.1 % nasal spray USE 1 TO 2 SPRAYS IN EACH NOSTRIL TWICE DAILY AS NEEDED   • Clotrimazole 1 % ointment Apply 1 application topically 2 (Two) Times a Day.   • EPINEPHrine (EPIPEN) 0.3 MG/0.3ML solution auto-injector injection USE AS DIRECTED FOR ACUTE ALLERGIC REACTION   • famotidine (PEPCID) 20 MG tablet Take 1 tablet by mouth 2 (Two) Times a Day.   • fexofenadine (Allegra Allergy) 180 MG tablet Take 1 tablet by mouth Daily.   • fluticasone (FLONASE) 50 MCG/ACT nasal spray fluticasone 50 mcg/actuation nasal spray,suspension spray 1 spray (50 mcg) in each nostril by intranasal route once daily   Suspended   • levocetirizine (XYZAL) 5 MG tablet    • mometasone (ELOCON) 0.1 % cream APPLY TOPICALLY TO THE AFFECTED AREA EVERY DAY AS NEEDED   • montelukast (SINGULAIR) 10 MG tablet Take  by mouth.   • valACYclovir (Valtrex) 1000 MG tablet Take 2 tablets by mouth 2 (Two) Times a Day.   • [DISCONTINUED] tiZANidine (ZANAFLEX) 2 MG tablet TAKE 1 TABLET BY MOUTH EVERY 8 HOURS AS NEEDED FOR MUSCLE SPASMS   • [DISCONTINUED] meloxicam (MOBIC) 15 MG tablet TAKE 1 TABLET BY MOUTH DAILY   • [DISCONTINUED] predniSONE (DELTASONE) 10 MG tablet Take 5 tabs PO daily x 3 days, then 4 tabs PO daily x 3 days, then 3 tabs PO daily x 3 days, then 2 tabs Po daily x 3 days, then 1 tab PO daily x 3 days.     No current facility-administered medications on file prior to visit.       Immunization History   Administered Date(s) Administered   • COVID-19 (MODERNA) 1st, 2nd, 3rd Dose Only 05/18/2021, 06/15/2021   • Tdap 09/06/2016       Review of Systems   Respiratory: Negative for shortness of breath.    Cardiovascular: Negative for chest pain.   Gastrointestinal: Negative for nausea and vomiting.   Musculoskeletal: Positive for neck pain.        Radiates to the posterior left scapula area    Neurological: Positive for numbness.        Objective     /72 (BP  "Location: Left arm, Patient Position: Sitting, Cuff Size: Adult)   Pulse 100   Temp 97.7 °F (36.5 °C) (Temporal)   Ht 172.7 cm (68\")   Wt 88.5 kg (195 lb)   SpO2 97%   BMI 29.65 kg/m²       Physical Exam  Vitals and nursing note reviewed.   Constitutional:       Appearance: Normal appearance.   HENT:      Head: Normocephalic.      Right Ear: External ear normal.      Left Ear: External ear normal.      Nose: Nose normal.      Mouth/Throat:      Mouth: Mucous membranes are moist.   Eyes:      Pupils: Pupils are equal, round, and reactive to light.   Cardiovascular:      Rate and Rhythm: Normal rate.   Pulmonary:      Effort: Pulmonary effort is normal.   Musculoskeletal:         General: Signs of injury present.      Left shoulder: No tenderness. Normal range of motion.      Cervical back: Spasms, tenderness and bony tenderness present. Pain with movement present.   Skin:     General: Skin is warm and dry.   Neurological:      Mental Status: He is alert and oriented to person, place, and time.   Psychiatric:         Mood and Affect: Mood normal.         Behavior: Behavior normal.         Thought Content: Thought content normal.         Judgment: Judgment normal.         Result Review :           MRI Shoulder Left Arthrogram (08/05/2022 14:48)  XR Shoulder 2+ View Left (In Office) (06/21/2022 13:11)  XR Spine Lumbar 2 or 3 View (In Office) (09/09/2021 12:05)      XR Spine Cervical Complete 4 or 5 View (In Office) (04/12/2023 11:50)             Assessment and Plan      Diagnoses and all orders for this visit:    1. Limited joint range of motion (Primary)  -     XR Spine Cervical Complete 4 or 5 View (In Office)  -     tiZANidine (ZANAFLEX) 2 MG tablet; Take 1 tablet by mouth Every 8 (Eight) Hours As Needed for Muscle Spasms.  Dispense: 30 tablet; Refill: 0  -     methylPREDNISolone (MEDROL) 4 MG dose pack; Take as directed on package instructions.  Dispense: 21 each; Refill: 0    2. Cervical pain (neck)  -     XR " Spine Cervical Complete 4 or 5 View (In Office)  -     tiZANidine (ZANAFLEX) 2 MG tablet; Take 1 tablet by mouth Every 8 (Eight) Hours As Needed for Muscle Spasms.  Dispense: 30 tablet; Refill: 0  -     methylPREDNISolone (MEDROL) 4 MG dose pack; Take as directed on package instructions.  Dispense: 21 each; Refill: 0    Obtaining x-rays today and will treat conservatively as patient is already using nonsteroidal anti-inflammatories over-the-counter, will add the Zanaflex and the Medrol Dosepak/steroid and offered to do physical therapy patient wants to wait on this at this present time and will reach back out to us should anything persist or worsen and we can proceed with that referral if needed        Follow Up     Return if symptoms worsen or fail to improve.

## 2023-09-14 ENCOUNTER — OFFICE VISIT (OUTPATIENT)
Dept: FAMILY MEDICINE CLINIC | Facility: CLINIC | Age: 48
End: 2023-09-14
Payer: OTHER GOVERNMENT

## 2023-09-14 VITALS
WEIGHT: 188 LBS | HEART RATE: 90 BPM | OXYGEN SATURATION: 100 % | HEIGHT: 68 IN | DIASTOLIC BLOOD PRESSURE: 90 MMHG | TEMPERATURE: 98 F | SYSTOLIC BLOOD PRESSURE: 118 MMHG | BODY MASS INDEX: 28.49 KG/M2

## 2023-09-14 DIAGNOSIS — S83.92XA SPRAIN OF LEFT KNEE, UNSPECIFIED LIGAMENT, INITIAL ENCOUNTER: Primary | ICD-10-CM

## 2023-09-14 DIAGNOSIS — M25.562 ACUTE PAIN OF LEFT KNEE: ICD-10-CM

## 2023-09-14 NOTE — PROGRESS NOTES
Answers submitted by the patient for this visit:  Primary Reason for Visit (Submitted on 2023)  What is the primary reason for your visit?: Lower Extremity Injury  Lower Extremity Injury Questionnaire (Submitted on 2023)  Chief Complaint: Lower extremity pain  Incident occurred: 3 to 5 days ago  Incident location: in the yard  Injury mechanism: a twisting injury  Pain location: left knee  Pain quality: aching  Pain - numeric: 5/10  Pain course: intermittent  tingling: Yes  inability to bear weight: Yes  loss of motion: Yes  muscle weakness: Yes  Foreign body present: no foreign bodies  Chief Complaint  Knee Pain (Left knee sprain playing basketball, on Friday)    Subjective        Past Medical History:   Diagnosis Date    Allergic     Arthritis     Asthma     Irritable bowel syndrome     Low back pain     Visual impairment      Social History     Tobacco Use    Smoking status: Former     Packs/day: 0.50     Years: 5.00     Pack years: 2.50     Types: Cigarettes, Pipe, Cigars     Quit date: 2007     Years since quittin.7     Passive exposure: Past    Smokeless tobacco: Former     Quit date:    Vaping Use    Vaping Use: Never used   Substance Use Topics    Alcohol use: Yes    Drug use: Never      Current Outpatient Medications on File Prior to Visit   Medication Sig    albuterol (ACCUNEB) 0.63 MG/3ML nebulizer solution albuterol sulfate 0.63 mg/3 mL inhalation solution for nebulization use in nebulizer as directed   Active    EPINEPHrine (EPIPEN) 0.3 MG/0.3ML solution auto-injector injection USE AS DIRECTED FOR ACUTE ALLERGIC REACTION    fexofenadine (Allegra Allergy) 180 MG tablet Take 1 tablet by mouth Daily.    [DISCONTINUED] azelastine (ASTELIN) 0.1 % nasal spray USE 1 TO 2 SPRAYS IN EACH NOSTRIL TWICE DAILY AS NEEDED    [DISCONTINUED] Clotrimazole 1 % ointment Apply 1 application topically 2 (Two) Times a Day.    [DISCONTINUED] famotidine (PEPCID) 20 MG tablet Take 1 tablet by mouth 2 (Two)  "Times a Day.    [DISCONTINUED] fluticasone (FLONASE) 50 MCG/ACT nasal spray fluticasone 50 mcg/actuation nasal spray,suspension spray 1 spray (50 mcg) in each nostril by intranasal route once daily   Suspended    [DISCONTINUED] levocetirizine (XYZAL) 5 MG tablet     [DISCONTINUED] methylPREDNISolone (MEDROL) 4 MG dose pack Take as directed on package instructions.    [DISCONTINUED] mometasone (ELOCON) 0.1 % cream APPLY TOPICALLY TO THE AFFECTED AREA EVERY DAY AS NEEDED    [DISCONTINUED] montelukast (SINGULAIR) 10 MG tablet Take  by mouth.    [DISCONTINUED] tiZANidine (ZANAFLEX) 2 MG tablet Take 1 tablet by mouth Every 8 (Eight) Hours As Needed for Muscle Spasms.    [DISCONTINUED] valACYclovir (Valtrex) 1000 MG tablet Take 2 tablets by mouth 2 (Two) Times a Day.     No current facility-administered medications on file prior to visit.      No Known Allergies   Health Maintenance Due   Topic Date Due    COVID-19 Vaccine (3 - Moderna series) 08/10/2021    HEPATITIS C SCREENING  Never done    ANNUAL PHYSICAL  09/09/2022    BMI FOLLOWUP  08/19/2023      Brendon Melara presents to [facility]  History of Present Illness  Here for left knee injury while playing basketball about 1 week ago. Pt states he came down and hyperextended the knee. Wearing a compression band to lower knee and states he has been resting and taking ibuprofen and ice. Denies swelling or bruising of the knee. Pt notes pain worsens with walking, steps are difficult. Notes pain starting in the foot, likely due to compensation with ambulation.   Notes hx of a leg lock injury in about 2006 while in the .     Objective   Vital Signs:   /90 (BP Location: Left arm)   Pulse 90   Temp 98 °F (36.7 °C)   Ht 172.7 cm (68\")   Wt 85.3 kg (188 lb)   SpO2 100%   BMI 28.59 kg/m²     Review of Systems   Physical Exam  Vitals reviewed.   Constitutional:       General: He is not in acute distress.     Appearance: Normal " appearance. He is well-developed.   Eyes:      Conjunctiva/sclera: Conjunctivae normal.      Pupils: Pupils are equal, round, and reactive to light.   Cardiovascular:      Rate and Rhythm: Normal rate and regular rhythm.      Heart sounds: Normal heart sounds.   Pulmonary:      Effort: Pulmonary effort is normal.      Breath sounds: Normal breath sounds.   Musculoskeletal:      Cervical back: Neck supple.      Left knee: Swelling present. No deformity or bony tenderness. Normal range of motion. No tenderness.      Instability Tests: Anterior drawer test negative. Posterior drawer test negative.      Right lower leg: No edema.      Left lower leg: No edema.   Skin:     General: Skin is warm and dry.   Neurological:      Mental Status: He is alert and oriented to person, place, and time.   Psychiatric:         Mood and Affect: Mood and affect normal.         Behavior: Behavior normal.         Thought Content: Thought content normal.         Judgment: Judgment normal.      Result Review :   The following data was reviewed by: JERMAINE Bourne on 09/14/2023:    Data reviewed : Radiologic studies left knee           Assessment and Plan    Diagnoses and all orders for this visit:    1. Sprain of left knee, unspecified ligament, initial encounter (Primary)    2. Acute pain of left knee  -     XR Knee 1 or 2 View Left (In Office)      Pt to have documentation to stay home from work for another week. Pt recommended against attending concert this weekend due to knee injury. Continue to ice and use ibuprofen and rest. Notify in 1 week with no improvement and will order MRI.    BMI is >= 25 and <30. (Overweight) The following options were offered after discussion;: weight loss educational material (shared in after visit summary)       Follow Up   Return in 1 week (on 9/21/2023), or if symptoms worsen or fail to improve.  Patient was given instructions and counseling regarding his condition or for health maintenance  advice. Please see specific information pulled into the AVS if appropriate.

## 2023-09-25 ENCOUNTER — OFFICE VISIT (OUTPATIENT)
Dept: FAMILY MEDICINE CLINIC | Facility: CLINIC | Age: 48
End: 2023-09-25

## 2023-09-25 VITALS
HEART RATE: 100 BPM | SYSTOLIC BLOOD PRESSURE: 114 MMHG | WEIGHT: 186.8 LBS | HEIGHT: 68 IN | BODY MASS INDEX: 28.31 KG/M2 | DIASTOLIC BLOOD PRESSURE: 80 MMHG | TEMPERATURE: 97.7 F | OXYGEN SATURATION: 99 %

## 2023-09-25 DIAGNOSIS — Z87.891 FORMER TOBACCO USE: Primary | ICD-10-CM

## 2023-09-25 DIAGNOSIS — M25.562 ACUTE PAIN OF LEFT KNEE: ICD-10-CM

## 2023-09-25 PROBLEM — M19.90 ARTHRITIS: Status: ACTIVE | Noted: 2023-09-25

## 2023-09-25 PROBLEM — J45.909 ASTHMA: Status: ACTIVE | Noted: 2023-09-25

## 2023-09-25 PROBLEM — J30.2 SEASONAL ALLERGIC RHINITIS: Status: ACTIVE | Noted: 2023-09-25

## 2023-09-25 PROBLEM — K58.9 IRRITABLE BOWEL SYNDROME: Status: ACTIVE | Noted: 2023-09-25

## 2023-09-25 NOTE — PROGRESS NOTES
Chief Complaint  Knee Injury (Left knee )    Subjective          Brendon Melara presents to [facility]  History of Present Illness  Pt says that left knee is healing- pt says that he is doing some better- he still cannot run on it- left knee is stable- pt still has some swelling but he says symptoms are improving some- pt still using brace- pt had hyperextended the knee when coming down playing basketball             Objective   No Known Allergies  Immunization History   Administered Date(s) Administered    COVID-19 (MODERNA) 1st,2nd,3rd Dose Monovalent 2021, 06/15/2021    Tdap 2016     Past Medical History:   Diagnosis Date    Allergic     Arthritis     Asthma     Irritable bowel syndrome     Low back pain     Visual impairment       Past Surgical History:   Procedure Laterality Date    EYE SURGERY        Social History     Socioeconomic History    Marital status:    Tobacco Use    Smoking status: Former     Packs/day: 0.50     Years: 5.00     Pack years: 2.50     Types: Cigarettes, Pipe, Cigars     Quit date: 2007     Years since quittin.7     Passive exposure: Past    Smokeless tobacco: Former     Quit date:    Vaping Use    Vaping Use: Never used   Substance and Sexual Activity    Alcohol use: Yes    Drug use: Never        Current Outpatient Medications:     albuterol (ACCUNEB) 0.63 MG/3ML nebulizer solution, albuterol sulfate 0.63 mg/3 mL inhalation solution for nebulization use in nebulizer as directed   Active, Disp: , Rfl:     EPINEPHrine (EPIPEN) 0.3 MG/0.3ML solution auto-injector injection, USE AS DIRECTED FOR ACUTE ALLERGIC REACTION, Disp: , Rfl:     fexofenadine (Allegra Allergy) 180 MG tablet, Take 1 tablet by mouth Daily., Disp: 90 tablet, Rfl: 1   Family History   Problem Relation Age of Onset    Melanoma Mother     Depression Father           Vital Signs:   Vitals:    23 1550   BP: 114/80   BP Location: Right arm   Patient  "Position: Sitting   Cuff Size: Adult   Pulse: 100   Temp: 97.7 °F (36.5 °C)   SpO2: 99%   Weight: 84.7 kg (186 lb 12.8 oz)   Height: 172.7 cm (68\")       Review of Systems   Physical Exam  Vitals reviewed.   Constitutional:       Appearance: Normal appearance. He is well-developed.   HENT:      Head: Normocephalic and atraumatic.      Right Ear: External ear normal.      Left Ear: External ear normal.      Mouth/Throat:      Pharynx: No oropharyngeal exudate.   Eyes:      Conjunctiva/sclera: Conjunctivae normal.      Pupils: Pupils are equal, round, and reactive to light.   Cardiovascular:      Rate and Rhythm: Normal rate and regular rhythm.      Pulses: Normal pulses.      Heart sounds: Normal heart sounds. No murmur heard.    No friction rub. No gallop.   Pulmonary:      Effort: Pulmonary effort is normal.      Breath sounds: Normal breath sounds. No wheezing or rhonchi.   Abdominal:      General: Bowel sounds are normal. There is no distension.      Palpations: Abdomen is soft.      Tenderness: There is no abdominal tenderness.   Musculoskeletal:         General: Normal range of motion.      Comments: Left knee generalized tenderness, mild swelling, no redness or warmth- knee is stable, normal ROM., no bruising.   Skin:     General: Skin is warm and dry.      Capillary Refill: Capillary refill takes less than 2 seconds.   Neurological:      General: No focal deficit present.      Mental Status: He is alert and oriented to person, place, and time.      Cranial Nerves: No cranial nerve deficit.   Psychiatric:         Mood and Affect: Mood and affect normal.         Behavior: Behavior normal.         Thought Content: Thought content normal.         Judgment: Judgment normal.      Result Review :                 Assessment and Plan    Diagnoses and all orders for this visit:    1. Former tobacco use (Primary)    2. Acute pain of left knee  -     MRI Knee Left With Contrast; Future            Follow Up   Return if " symptoms worsen or fail to improve.  Patient was given instructions and counseling regarding his condition or for health maintenance advice. Please see specific information pulled into the AVS if appropriate.

## 2023-10-05 ENCOUNTER — TELEPHONE (OUTPATIENT)
Dept: FAMILY MEDICINE CLINIC | Facility: CLINIC | Age: 48
End: 2023-10-05
Payer: OTHER GOVERNMENT

## 2023-10-05 DIAGNOSIS — S83.512A RUPTURE OF ANTERIOR CRUCIATE LIGAMENT OF LEFT KNEE, INITIAL ENCOUNTER: ICD-10-CM

## 2023-10-05 DIAGNOSIS — M25.562 ACUTE PAIN OF LEFT KNEE: Primary | ICD-10-CM

## 2023-10-09 ENCOUNTER — OFFICE VISIT (OUTPATIENT)
Dept: ORTHOPEDIC SURGERY | Facility: CLINIC | Age: 48
End: 2023-10-09
Payer: OTHER GOVERNMENT

## 2023-10-09 VITALS
HEIGHT: 68 IN | SYSTOLIC BLOOD PRESSURE: 119 MMHG | WEIGHT: 191.6 LBS | DIASTOLIC BLOOD PRESSURE: 81 MMHG | HEART RATE: 85 BPM | OXYGEN SATURATION: 97 % | BODY MASS INDEX: 29.04 KG/M2

## 2023-10-09 DIAGNOSIS — S83.512A RUPTURE OF ANTERIOR CRUCIATE LIGAMENT OF LEFT KNEE, INITIAL ENCOUNTER: Primary | ICD-10-CM

## 2023-10-09 DIAGNOSIS — M94.262 CHONDROMALACIA OF LEFT KNEE: ICD-10-CM

## 2023-10-09 DIAGNOSIS — S83.242A ACUTE MEDIAL MENISCUS TEAR OF LEFT KNEE, INITIAL ENCOUNTER: ICD-10-CM

## 2023-10-09 PROCEDURE — 99214 OFFICE O/P EST MOD 30 MIN: CPT | Performed by: STUDENT IN AN ORGANIZED HEALTH CARE EDUCATION/TRAINING PROGRAM

## 2023-10-09 NOTE — PROGRESS NOTES
"Chief Complaint  Pain of the Left Knee    Subjective          Brendon Melara presents to [facility] for   History of Present Illness    The patient presents here today for evaluation of the left ankle. The patient reports he injured his left knee about 3 weeks ago playing basketball. He denies pain prior to the injury. He has no other complaints. He recently had an MRI.   No Known Allergies     Social History     Socioeconomic History    Marital status:    Tobacco Use    Smoking status: Former     Packs/day: 0.50     Years: 5.00     Additional pack years: 0.00     Total pack years: 2.50     Types: Cigarettes, Pipe, Cigars     Quit date: 2007     Years since quittin.7     Passive exposure: Past    Smokeless tobacco: Former     Quit date:    Vaping Use    Vaping Use: Never used   Substance and Sexual Activity    Alcohol use: Yes    Drug use: Never        I reviewed the patient's chief complaint, history of present illness, review of systems, past medical history, surgical history, family history, social history, medications, and allergy list.     REVIEW OF SYSTEMS    Constitutional: Denies fevers, chills, weight loss  Cardiovascular: Denies chest pain, shortness of breath  Skin: Denies rashes, acute skin changes  Neurologic: Denies headache, loss of consciousness  MSK: Left knee pain      Objective   Vital Signs:   /81   Pulse 85   Ht 172.7 cm (68\")   Wt 86.9 kg (191 lb 9.6 oz)   SpO2 97%   BMI 29.13 kg/mý     Body mass index is 29.13 kg/mý.    Physical Exam    General: Alert. No acute distress.   LEFT KNEE- ROM -5 to 120 degrees. Stable to varus/valgus stress. Increased translation with Lachman's. medial joint line tenderness. Pain with Larry's. Positive EHL, FHL, GS and TA. Sensation intact to all 5 nerves of the foot. Positive pulses.     Procedures    MRI- IMPRESSION: High-grade near complete tear of the proximal anterior cruciate ligament at its " femoral attachment, with only a few  intact fibers remaining. Posttraumatic bony contusions of the medial and lateral femoral condyles as well as the posterior medial  and lateral tibial plateaus without discrete fracture line.  Focal full-thickness cartilage defect underlying the medial femoral condyle bony contusion measuring 6 x 5 mm.  Oblique longitudinal tear of the posterior horn of the medial meniscus extending to the posterior body horn junction.  Small joint effusion.    Imaging Results (Most Recent)       None                     Assessment and Plan        XR Knee 1 or 2 View Left (In Office)    Result Date: 9/14/2023  Narrative: PROCEDURE: XR KNEE 1 OR 2 VW LEFT  COMPARISON: None  INDICATIONS: left knee hyperextension while playing basketball  FINDINGS:  BONES: Normal.  No significant arthropathy or acute abnormality.  SOFT TISSUES: Negative.  No visible soft tissue swelling.  EFFUSION: None visible.  OTHER: Negative.       Impression:   1. No acute osseous abnormality.  If there is clinical concern for internal derangement a MRI would be more sensitive to further evaluate      JUSTICE CARTER MD       Electronically Signed and Approved By: JUSTICE CARTER MD on 9/14/2023 at 15:11               Diagnoses and all orders for this visit:    1. Rupture of anterior cruciate ligament of left knee, initial encounter (Primary)    2. Acute medial meniscus tear of left knee, initial encounter    3. Chondromalacia of left knee        Discussed the treatment options with the patient, operative vs non-operative. I reviewed the MRI with the patient. Discussed the risks and benefits of a left knee arthroscopic acl reconstruction, medial meniscus repair vs partial menisectomy and chondroplasty vs microfracture. The patient expressed understanding and wished to think about it. Knee brace given today. Home exercises discussed.       Discussed surgery., Risks/benefits discussed with patient including, but not limited to:  infection, bleeding, neurovascular damage, re-rupture, aesthetic deformity, need for further surgery, and death., Discussed with patient the implant type being used during surgery and patient understands., Surgery pamphlet given., Call or return if worsening symptoms., DME order for a 3 in 1 given today due to patient will be confined to one room/level of the home that does not offer a toilet during postop recovery. , I am requesting authorization for the samr System to reduce the patient's pain and aid in their recovery. I believe the samr Sport System will have positive impact on my patient's quality of life. I would appreciate prompt review of the information and authorization of the samr System.  samr Sport utilizes ultrasonic waves that penetrate 5 cm into the tissue, which increases circulation, oxygen and nutrient delivery, and the removal of waste products, such as lactic acid, from the site of the musculoskeletal injury.  The samr System is a new FDA approved (FDA 510K 174629) treatment modality that that has been shown in recent clinical trials sponsored by the NIH (National Institutes of Health), the DOD (Department of Defense) and [facility] the research arm of the [employer] ([employer]) to reduce patient's pain, increase mobility and speed recovery. , and I am ordering the use of the Nice1 cold therapy machine for 60 days post-op as part of an opioid-sparing approach to help manage pain and edema.  I feel this is medically necessary for the best care for this patient.       Scribed for Ki Morgan MD by Brianne Napoles  10/09/2023   14:52 EDT         Follow Up       3 weeks    Patient was given instructions and counseling regarding his condition or for health maintenance advice. Please see specific information pulled into the AVS if appropriate.       I have personally performed the services described in this document as scribed by the above individual and it is both  accurate and complete.     Ki Morgan MD  10/09/23  14:58 EDT

## 2024-07-12 ENCOUNTER — OFFICE VISIT (OUTPATIENT)
Dept: FAMILY MEDICINE CLINIC | Facility: CLINIC | Age: 49
End: 2024-07-12
Payer: OTHER GOVERNMENT

## 2024-07-12 VITALS
DIASTOLIC BLOOD PRESSURE: 80 MMHG | OXYGEN SATURATION: 98 % | HEART RATE: 95 BPM | WEIGHT: 171.1 LBS | BODY MASS INDEX: 25.93 KG/M2 | TEMPERATURE: 98.1 F | HEIGHT: 68 IN | SYSTOLIC BLOOD PRESSURE: 120 MMHG

## 2024-07-12 DIAGNOSIS — L98.9 SKIN LESION OF LEFT LEG: ICD-10-CM

## 2024-07-12 DIAGNOSIS — F32.A DEPRESSION, UNSPECIFIED DEPRESSION TYPE: Primary | ICD-10-CM

## 2024-07-12 PROCEDURE — 99213 OFFICE O/P EST LOW 20 MIN: CPT | Performed by: FAMILY MEDICINE

## 2024-07-12 NOTE — PROGRESS NOTES
Chief Complaint  Annual Exam and PTSD (Would like to see Behavior health/)    Subjective          Brendon Melara presents to [facility]  History of Present Illness  Left lower leg lesion x 2 yrs- getting larger over last 2 months- irritated getting larger- need to have removed  Depression  Presents for initial visit. Episode onset: 2 yrs. The problem is unchanged since onset. Symptoms include depressed mood. Patient is not experiencing: compulsions, confusion, decreased concentration, dry mouth, excessive worry, feelings of hopelessness, feelings of worthlessness, hypersomnia, hyperventilation, insomnia, irritability, muscle tension, nervousness/anxiety, obsessions, palpitations, panic, psychomotor agitation, psychomotor retardation, shortness of breath, suicidal ideas, suicidal planning, thoughts of death, weight gain, weight loss, chest pain, feeling of choking, dizziness, malaise/fatigue, nausea, difficulty controlling mood, difficulty staying asleep and difficulty falling asleep.Symptoms occur constantly.  The severity of symptoms is moderate.  The quality of sleep is good. Awakens seldom during the night. His past medical history is significant for depression. Past treatments include nothing.                Objective   No Known Allergies  Immunization History   Administered Date(s) Administered    COVID-19 (MODERNA) 1st,2nd,3rd Dose Monovalent 05/18/2021, 06/15/2021    Tdap 09/06/2016     Past Medical History:   Diagnosis Date    Allergic     Arthritis     Asthma     Irritable bowel syndrome     Low back pain     Visual impairment       Past Surgical History:   Procedure Laterality Date    EYE SURGERY        Social History     Socioeconomic History    Marital status:    Tobacco Use    Smoking status: Former     Current packs/day: 0.00     Average packs/day: 0.5 packs/day for 5.0 years (2.5 ttl pk-yrs)     Types: Cigarettes, Pipe, Cigars     Start date: 1/1/2002     Quit  "date: 2007     Years since quittin.5     Passive exposure: Past    Smokeless tobacco: Former     Quit date:    Vaping Use    Vaping status: Never Used   Substance and Sexual Activity    Alcohol use: Yes    Drug use: Never    Sexual activity: Defer        Current Outpatient Medications:     EPINEPHrine (EPIPEN) 0.3 MG/0.3ML solution auto-injector injection, USE AS DIRECTED FOR ACUTE ALLERGIC REACTION, Disp: , Rfl:     albuterol (ACCUNEB) 0.63 MG/3ML nebulizer solution, albuterol sulfate 0.63 mg/3 mL inhalation solution for nebulization use in nebulizer as directed   Active (Patient not taking: Reported on 2024), Disp: , Rfl:     fexofenadine (Allegra Allergy) 180 MG tablet, Take 1 tablet by mouth Daily. (Patient not taking: Reported on 2024), Disp: 90 tablet, Rfl: 1   Family History   Problem Relation Age of Onset    Melanoma Mother     Depression Father           Vital Signs:   Vitals:    24 1507   BP: 120/80   Pulse: 95   Temp: 98.1 °F (36.7 °C)   SpO2: 98%   Weight: 77.6 kg (171 lb 1.6 oz)   Height: 172.7 cm (68\")       Review of Systems   Constitutional:  Negative for irritability, malaise/fatigue, weight gain and weight loss.   Respiratory:  Negative for shortness of breath.    Cardiovascular:  Negative for chest pain and palpitations.   Gastrointestinal:  Negative for nausea.   Neurological:  Negative for dizziness.   Psychiatric/Behavioral:  Negative for confusion, decreased concentration and suicidal ideas. The patient is not nervous/anxious and does not have insomnia.       Physical Exam  Vitals reviewed.   Constitutional:       Appearance: Normal appearance. He is well-developed.   HENT:      Head: Normocephalic and atraumatic.      Right Ear: External ear normal.      Left Ear: External ear normal.      Mouth/Throat:      Pharynx: No oropharyngeal exudate.   Eyes:      Conjunctiva/sclera: Conjunctivae normal.      Pupils: Pupils are equal, round, and reactive to light. "   Cardiovascular:      Rate and Rhythm: Normal rate and regular rhythm.      Pulses: Normal pulses.      Heart sounds: Normal heart sounds. No murmur heard.     No friction rub. No gallop.   Pulmonary:      Effort: Pulmonary effort is normal.      Breath sounds: Normal breath sounds. No wheezing or rhonchi.   Abdominal:      General: Abdomen is flat. Bowel sounds are normal. There is no distension.      Palpations: Abdomen is soft. There is no mass.      Tenderness: There is no abdominal tenderness. There is no guarding or rebound.      Hernia: No hernia is present.   Musculoskeletal:         General: Normal range of motion.   Skin:     General: Skin is warm and dry.      Capillary Refill: Capillary refill takes less than 2 seconds.      Comments: Left lower posterior leg 0.5cm raised skin colored lesion with redness around lesion for 2-3mm.   Neurological:      General: No focal deficit present.      Mental Status: He is alert and oriented to person, place, and time.      Cranial Nerves: No cranial nerve deficit.   Psychiatric:         Mood and Affect: Mood and affect normal.         Behavior: Behavior normal.         Thought Content: Thought content normal.         Judgment: Judgment normal.        Result Review :                 Assessment and Plan    Diagnoses and all orders for this visit:    1. Depression, unspecified depression type (Primary)  -     Ambulatory Referral to Psychiatry    2. Skin lesion of left leg    Will schedule to remove lesion.        Follow Up   Return in about 1 week (around 7/19/2024) for need to remove lesion- 30 minutes.  Patient was given instructions and counseling regarding his condition or for health maintenance advice. Please see specific information pulled into the AVS if appropriate.

## 2024-07-18 ENCOUNTER — OFFICE VISIT (OUTPATIENT)
Age: 49
End: 2024-07-18
Payer: OTHER GOVERNMENT

## 2024-07-18 VITALS
SYSTOLIC BLOOD PRESSURE: 116 MMHG | BODY MASS INDEX: 25.91 KG/M2 | OXYGEN SATURATION: 98 % | WEIGHT: 171 LBS | DIASTOLIC BLOOD PRESSURE: 78 MMHG | HEART RATE: 114 BPM | HEIGHT: 68 IN

## 2024-07-18 DIAGNOSIS — R45.4 IRRITABLE MOOD: ICD-10-CM

## 2024-07-18 DIAGNOSIS — F32.0 CURRENT MILD EPISODE OF MAJOR DEPRESSIVE DISORDER WITHOUT PRIOR EPISODE: Primary | ICD-10-CM

## 2024-07-18 NOTE — TREATMENT PLAN
Multi-Disciplinary Problems (from Behavioral Health Treatment Plan)      Active Problems       Problem: Anger  Start Date: 07/18/24      Problem Details: The patient self-scales this problem as a 4 with 10 being the worst.          Goal Priority Start Date Expected End Date End Date    Patient will develop specific, socially acceptable way to manage anger. -- 07/18/24 -- --    Goal Details: Progress toward goal:  Not appropriate to rate progress toward goal since this is the initial treatment plan.          Goal Intervention Frequency Start Date End Date    Process patient's angry feelings or outbursts that have recently occurred and review alternative behaviors PRN 07/18/24 --    Intervention Details: Duration of treatment until remission of symptoms.          Goal Intervention Frequency Start Date End Date    Work with patient to develop constructive way to handle anger. PRN 07/18/24 --    Intervention Details: Duration of treatment until remission of symptoms.                  Problem: Depression  Start Date: 07/18/24      Problem Details: The patient self-scales this problem as a 8 with 10 being the worst.          Goal Priority Start Date Expected End Date End Date    Patient will demonstrate the ability to initiate new constructive life skills outside of sessions on a consistent basis. -- 07/18/24 -- --    Goal Details: Progress toward goal:  Not appropriate to rate progress toward goal since this is the initial treatment plan.          Goal Intervention Frequency Start Date End Date    Assist patient in setting attainable activities of daily living goals. PRN 07/18/24 --      Goal Intervention Frequency Start Date End Date    Provide education about depression PRN 07/18/24 --    Intervention Details: Duration of treatment until remission of symptoms.          Goal Intervention Frequency Start Date End Date    Assist patient in developing healthy coping strategies. PRN 07/18/24 --    Intervention Details:  Duration of treatment until remission of symptoms.                                 I have discussed and reviewed this treatment plan with the patient.

## 2024-07-18 NOTE — PROGRESS NOTES
"[facility]  Initial Evaluation    Referring Provider:   Thank you   Johnnie Lozano MD  [address] [place] [address] [place] [address]  Your referral is greatly appreciated.    Per Referring Provider depression    Chief Complaint: \"depression, mood swings mainly\"    History of Present Illness: Brendon Melara is a 49 y.o. male who presents today for initial evaluation regarding depression. He presents unaccompanied in no acute distress and engages with me appropriately. No psychotropic regimen with which patient presents.     Patient started feeling depression, mood swings about 2 - 3 years ago. Patient reports he retired from  in 2017. He has also lost contact with his father which he believes has had some effect on his depression. Reports loss of friends during  service and the loss of being needed by his children. Enjoyed coming home and attending different activities with children, now they are 13 - 22 and \"off to their own things\". Some times at work has trouble concentrating, mind will trail off, patient goes through mental exercises to get himself focused again. Reports loss of appetite and lost 30 lbs since 01/2024. Eating healthier more fruits, vegetables, and proteins. When he gets depressed he will get angry, usually will vent to where he is hitting his punching bag. Does states sometimes he will hit the punching bag until his knuckles are bruised or hurting. Coping skills listening to music, text a friend, work out until he's tired    Discussed PTSD, but patient does not endorse experiencing trauma that has caused flashbacks, avoidant behaviors, or hypervigilance.    History is positive for signs/symptoms suggestive of MDD. Psychiatric screening is negative for pathognomonic history of: TBI, PTSD, laila, psychosis, violence, and suicidality      Education  I have counseled the patient with regard to diagnoses and the recommended treatment regimen as " documented below. After discussion with patient he would like to try therapy at this time. Patient would like to avoid taking medications. Patient acknowledges the diagnoses per my rendered interpretation. Patient demonstrates awareness/understanding of viable alternatives for treatment such as addition of medication if needed.    Recommended lifestyle changes: Relaxation techniques. Being aware of potential use of boxing as a self-harming behavior.    Psychiatric History:  Diagnoses: MDD  Outpatient history: none  Inpatient history: denies  Medication trials: denies  Other treatment modalities: tried [facility] online for 1 month, didn't like online therapy  Presenting regimen: none  Self harm: denies  Suicide attempts: denies  Auditory hallucinations: denies  Visual hallucinations: denies    Substance Abuse History:   Types/methods/frequency: alcohol (whiskey) use 2 - 3 drinks on average, will drink when mood is low, will not go past 3 drinks    Social History:  Residence: lives house with wife, 5 kids (13 - 22)  Vocation: retired , HR supervisor [employer]  Education: Bachelors degree, some graduate courses  Pertinent developmental history: denies  Trauma: , loss of father  Pertinent legal history: denies  Protective factors: wife and children  Hobbies/interests: working out, spiritual activities  Sabianism: Wiccan  Exercise: boxing, release emotions  Dietary habits: since Jan 2024, lost 30 lbs, hasn't been as hungry  Sleep issues/hygiene: no pertinent issues  Social habits: defer  Sunlight: no concern for under-exposure  Caffeine intake: 3 c coffee in the morning, decaf tea, no soda  Hydration habits: no pertinent issues   history: retired [employer]    Family History:  Family history of psychiatric disorders: father, brother, sister - depression  Suicide Attempts: denies  Suicide Completions: denies     Social History     Socioeconomic History    Marital status:    Tobacco Use     Smoking status: Former     Current packs/day: 0.00     Average packs/day: 0.5 packs/day for 5.0 years (2.5 ttl pk-yrs)     Types: Cigarettes, Pipe, Cigars     Start date: 2002     Quit date: 2007     Years since quittin.5     Passive exposure: Past    Smokeless tobacco: Former     Quit date:    Vaping Use    Vaping status: Never Used   Substance and Sexual Activity    Alcohol use: Yes    Drug use: Never    Sexual activity: Defer       Access to Firearms: yes, locked in a safe    Tobacco use counseling/intervention: former smoker, quit ; Currently remission by transtheoretical model.     PHQ-9 Depression Screening  PHQ-9 Total Score: 7    Little interest or pleasure in doing things? 1-->several days   Feeling down, depressed, or hopeless? 1-->several days   Trouble falling or staying asleep, or sleeping too much? 1-->several days   Feeling tired or having little energy? 1-->several days   Poor appetite or overeating? 1-->several days   Feeling bad about yourself - or that you are a failure or have let yourself or your family down? 1-->several days   Trouble concentrating on things, such as reading the newspaper or watching television? 1-->several days   Moving or speaking so slowly that other people could have noticed? Or the opposite - being so fidgety or restless that you have been moving around a lot more than usual? 0-->not at all   Thoughts that you would be better off dead, or of hurting yourself in some way? 0-->not at all   PHQ-9 Total Score 7     LAURIE-7  Feeling nervous, anxious or on edge: Several days  Not being able to stop or control worrying: Several days  Worrying too much about different things: Several days  Trouble Relaxing: Several days  Being so restless that it is hard to sit still: Several days  Feeling afraid as if something awful might happen: Not at all  Becoming easily annoyed or irritable: More than half the days  LAURIE 7 Total Score: 7  If you checked any problems, how  "difficult have these problems made it for you to do your work, take care of things at home, or get along with other people: Somewhat difficult    Problem List:  Patient Active Problem List   Diagnosis    Tear of left glenoid labrum    Acute pain of left shoulder    Arthritis    Asthma    Irritable bowel syndrome    Seasonal allergic rhinitis    Former tobacco use     Allergy:   No Known Allergies     Discontinued Medications:  There are no discontinued medications.    Current Medications:   Current Outpatient Medications   Medication Sig Dispense Refill    albuterol (ACCUNEB) 0.63 MG/3ML nebulizer solution       EPINEPHrine (EPIPEN) 0.3 MG/0.3ML solution auto-injector injection USE AS DIRECTED FOR ACUTE ALLERGIC REACTION      fexofenadine (Allegra Allergy) 180 MG tablet Take 1 tablet by mouth Daily. 90 tablet 1     No current facility-administered medications for this visit.     Past Medical History:  Past Medical History:   Diagnosis Date    Allergic     Arthritis     Asthma     Irritable bowel syndrome     Low back pain     Visual impairment      Past Surgical History:  Past Surgical History:   Procedure Laterality Date    EYE SURGERY       Family History:   Family History   Problem Relation Age of Onset    Melanoma Mother     Depression Father      Negative for dementia, seizures, bipolar disorder, and substance dependence unless otherwise noted    Mental Status Exam:   Appearance: well-groomed, normal habitus, age-appropriate, and sits upright   Behavior: calm, appropriate in demeanor, appropriate eye-contact, and relaxed  Mood/affect: \"depressed\" and full  Speech:  within expected variance, appropriate rate, appropriate rhythm, and appropriate tone  Thought Process: linear, logical, and concrete  Thought Content: coherent and devoid of overt delusions/perceptual disturbances   SI/HI: denies both SI and HI, exhibits future-orientation, self-advocates appropriately, and no appreciable intent  Memory: no overt " "deficits  Orientation: oriented to person/place/time/situation  Concentration: appropriate during interview  Intellectual capacity: presumptively average  Insight: fair by given history/exam  Judgment: fair and appropriate by given history/exam   Psychomotor: no appreciable latency/retardation/agitation/tremor  Gait: WNL and stable    Review of Systems:   Review of Systems   Respiratory:  Negative for shortness of breath.    Cardiovascular:  Negative for chest pain.   Neurological:  Negative for dizziness, tremors and headaches.   Psychiatric/Behavioral:  Positive for decreased concentration and dysphoric mood. Negative for agitation, hallucinations, self-injury, sleep disturbance and suicidal ideas. The patient is not nervous/anxious and is not hyperactive.       Vital Signs:   /78   Pulse 114   Ht 172.7 cm (68\")   Wt 77.6 kg (171 lb)   SpO2 98%   BMI 26.00 kg/m²      Lab Results:   No visits with results within 6 Month(s) from this visit.   Latest known visit with results is:   Office Visit on 03/03/2022   Component Date Value Ref Range Status    Class Description 03/03/2022 Comment   Final        Levels of Specific IgE       Class  Description of Class      ---------------------------  -----  --------------------                     < 0.10         0         Negative             0.10 -    0.31         0/I       Equivocal/Low             0.32 -    0.55         I         Low             0.56 -    1.40         II        Moderate             1.41 -    3.90         III       High             3.91 -   19.00         IV        Very High            19.01 -  100.00         V         Very High                    >100.00         VI        Very High    IgE 03/03/2022 82  6 - 495 IU/mL Final    [id number] Alpha-Gal 03/03/2022 <0.10  Class 0 kU/L Final    Beef 03/03/2022 <0.10  Class 0 kU/L Final    Pork 03/03/2022 <0.10  Class 0 kU/L Final    Lamb 03/03/2022 <0.10  Class 0 kU/L Final     EKG Results:  No orders to " display     Imaging Results:  No Images in the past 120 days found..    ASSESSMENT AND PLAN:    ICD-10-CM ICD-9-CM   1. Current mild episode of major depressive disorder without prior episode  F32.0 296.21   2. Irritable mood  R45.4 799.22       49 y.o. male who presents today for initial evaluation regarding MDD. We have discussed the history and interpreted diagnoses as above as well as the treatment plan below, including potential of risk/benefits/side effects of the recommended regimen of which the patient demonstrates understanding. Patient is agreeable to call 911 or go to the nearest ER should he become concerned for his own safety and/or the safety of those around his. There are no overt indices of acute laila/psychosis on evaluation today.     Medication regimen: no medication regimen at this time; patient is advised to notify provider if he would like to start medication, Patient is agreeable to plan.  Risk Assessment: protracted risk is low, imminent risk is low. Risk factors include: depressed mood disorder and recent/ongoing psychosocial stressors. Protective factors include: intact reality testing, no present SI, no stated history of suicide attempts or self-harm, patient's exhibited future-orientation, strong social support, and patient's cooperation with care. Do note that this is subject to change with the Hindu of new stressors, treatment non-adherence, use of substances, and/or new medical ails.  Monitoring: no recent labs to review as populated above, no labs ordered; PHQ-9 today is 7/27, LAURIE-7 today is 7/21  Therapy: Sabrina Griggs  Follow-up: prn   Communications: N/A    TREATMENT PLAN/GOALS: challenge patterns of living conducive to symptom burden, implement recommended regimen as above with augmentative, intermittent supportive psychotherapy to reduce symptom burden. Patient acknowledged and verbally consented to begin treatment as above. The importance of adherence to the recommended  treatment and interval follow-up appointments was emphasized today. Patient was today advised to limit daily caffeine intake, hydrate appropriately, eat healthy and nutritious foods, engage sleep hygiene measures, engage appropriate exposure to sunlight, engage with hobbies in balance with life necessities, and exercise appropriate to their capacity to do so.     Billing: I have seen the patient today and considered his psychiatric complaints, rendered a diagnosis, and discussed treatment with the patient as above with which he consents.    Parts of this note are electronic transcriptions/translations of spoken language to printed text using the Dragon Dictation system.    Electronically signed by JERMAINE Pagan, 07/18/24, 1100 EDT

## 2024-07-22 ENCOUNTER — PROCEDURE VISIT (OUTPATIENT)
Dept: FAMILY MEDICINE CLINIC | Facility: CLINIC | Age: 49
End: 2024-07-22
Payer: OTHER GOVERNMENT

## 2024-07-22 VITALS
WEIGHT: 171 LBS | SYSTOLIC BLOOD PRESSURE: 120 MMHG | DIASTOLIC BLOOD PRESSURE: 60 MMHG | OXYGEN SATURATION: 99 % | HEART RATE: 75 BPM | TEMPERATURE: 98.1 F | BODY MASS INDEX: 26 KG/M2

## 2024-07-22 DIAGNOSIS — L98.9 SKIN LESION OF LEFT LEG: Primary | ICD-10-CM

## 2024-07-22 PROCEDURE — 88305 TISSUE EXAM BY PATHOLOGIST: CPT | Performed by: FAMILY MEDICINE

## 2024-07-22 PROCEDURE — 11401 EXC TR-EXT B9+MARG 0.6-1 CM: CPT | Performed by: FAMILY MEDICINE

## 2024-07-22 NOTE — PROGRESS NOTES
Chief Complaint  Skin Lesion (Skin lesion biopsy on left lower leg )    Subjective          Brendon Melara presents to [facility]  History of Present Illness  Left lower leg lesion getting larger over last few months               Objective   No Known Allergies  Immunization History   Administered Date(s) Administered    COVID-19 (MODERNA) 1st,2nd,3rd Dose Monovalent 2021, 06/15/2021    Tdap 2016     Past Medical History:   Diagnosis Date    Allergic     Arthritis     Asthma     Irritable bowel syndrome     Low back pain     Visual impairment       Past Surgical History:   Procedure Laterality Date    EYE SURGERY        Social History     Socioeconomic History    Marital status:    Tobacco Use    Smoking status: Former     Current packs/day: 0.00     Average packs/day: 0.5 packs/day for 5.0 years (2.5 ttl pk-yrs)     Types: Cigarettes, Pipe, Cigars     Start date: 2002     Quit date: 2007     Years since quittin.5     Passive exposure: Past    Smokeless tobacco: Former     Quit date:    Vaping Use    Vaping status: Never Used   Substance and Sexual Activity    Alcohol use: Yes    Drug use: Never    Sexual activity: Defer        Current Outpatient Medications:     albuterol (ACCUNEB) 0.63 MG/3ML nebulizer solution, , Disp: , Rfl:     EPINEPHrine (EPIPEN) 0.3 MG/0.3ML solution auto-injector injection, USE AS DIRECTED FOR ACUTE ALLERGIC REACTION, Disp: , Rfl:     fexofenadine (Allegra Allergy) 180 MG tablet, Take 1 tablet by mouth Daily., Disp: 90 tablet, Rfl: 1   Family History   Problem Relation Age of Onset    Melanoma Mother     Depression Father           Vital Signs:   Vitals:    24 1019   BP: 120/60   Pulse: 75   Temp: 98.1 °F (36.7 °C)   SpO2: 99%   Weight: 77.6 kg (171 lb)       Review of Systems   Constitutional:  Negative for fatigue and fever.   HENT:  Negative for sore throat.    Eyes:  Negative for visual disturbance.    Respiratory:  Negative for cough, chest tightness, shortness of breath and wheezing.    Cardiovascular:  Negative for chest pain, palpitations and leg swelling.   Gastrointestinal:  Negative for abdominal pain, diarrhea, nausea and vomiting.   Neurological:  Negative for dizziness, light-headedness and headaches.      Physical Exam  Vitals reviewed.   Constitutional:       Appearance: Normal appearance. He is well-developed.   HENT:      Head: Normocephalic and atraumatic.      Right Ear: External ear normal.      Left Ear: External ear normal.      Mouth/Throat:      Pharynx: No oropharyngeal exudate.   Eyes:      Conjunctiva/sclera: Conjunctivae normal.      Pupils: Pupils are equal, round, and reactive to light.   Cardiovascular:      Rate and Rhythm: Normal rate and regular rhythm.      Pulses: Normal pulses.      Heart sounds: Normal heart sounds. No murmur heard.     No friction rub. No gallop.   Pulmonary:      Effort: Pulmonary effort is normal.      Breath sounds: Normal breath sounds. No wheezing or rhonchi.   Abdominal:      General: Abdomen is flat. Bowel sounds are normal. There is no distension.      Palpations: Abdomen is soft. There is no mass.      Tenderness: There is no abdominal tenderness. There is no guarding or rebound.      Hernia: No hernia is present.   Musculoskeletal:         General: Normal range of motion.   Skin:     General: Skin is warm and dry.      Capillary Refill: Capillary refill takes less than 2 seconds.      Comments: Left lower leg raised skin colored lesion 1cm, no redness, warmth, or bruising.   Neurological:      General: No focal deficit present.      Mental Status: He is alert and oriented to person, place, and time.      Cranial Nerves: No cranial nerve deficit.   Psychiatric:         Mood and Affect: Mood and affect normal.         Behavior: Behavior normal.         Thought Content: Thought content normal.         Judgment: Judgment normal.        Result Review :           Biopsy    Date/Time: 7/22/2024 10:56 AM    Performed by: Johnnie Lozano MD  Authorized by: Johnnie Lozano MD    Procedure Details - Skin Biopsy:     Body area: lower extremity    Lower extremity location: L lower leg    Initial size (mm): 10    Final defect size (mm): 15    Malignancy: malignancy unknown      Destruction method comment: full-thickness excision with 2 mm borders    Cosmetic: no.      Comments:   Area identified and made sterile with betadine.  Sterile technique used for entire procedure.  Area numbed with 2ml 2% lidocaine with epi (NDC 7112-7961-09, lot [id number], exp 9/1/24).  #15 blade used to make elliptical incision to skin.  Entire lesion removed with full-thickness excision with 2 mm borders around lesion.  Lesion then sent to pathology.  Wound closed with 3-0 ethylene sutures.  Three simple interrupted sutures used to close wound.  Wound care discussed with patient.  Hemostasis achieved.  Pt tolerated procedure well.  No complications.  Scant blood loss.          Assessment and Plan    Diagnoses and all orders for this visit:    1. Skin lesion of left leg (Primary)  -     Tissue Pathology Exam; Future    Other orders  -     Biopsy            Follow Up   Return in about 2 weeks (around 8/5/2024) for Recheck.  Patient was given instructions and counseling regarding his condition or for health maintenance advice. Please see specific information pulled into the AVS if appropriate.

## 2024-07-24 LAB
CYTO UR: NORMAL
LAB AP CASE REPORT: NORMAL
LAB AP CLINICAL INFORMATION: NORMAL
PATH REPORT.FINAL DX SPEC: NORMAL
PATH REPORT.GROSS SPEC: NORMAL

## 2024-08-05 ENCOUNTER — OFFICE VISIT (OUTPATIENT)
Dept: FAMILY MEDICINE CLINIC | Facility: CLINIC | Age: 49
End: 2024-08-05
Payer: OTHER GOVERNMENT

## 2024-08-05 VITALS
WEIGHT: 171.3 LBS | DIASTOLIC BLOOD PRESSURE: 80 MMHG | SYSTOLIC BLOOD PRESSURE: 110 MMHG | BODY MASS INDEX: 26.05 KG/M2 | HEART RATE: 93 BPM | OXYGEN SATURATION: 99 % | TEMPERATURE: 97.9 F

## 2024-08-05 DIAGNOSIS — L98.9 SKIN LESION OF LEFT LEG: Primary | ICD-10-CM

## 2024-08-05 NOTE — PROGRESS NOTES
Chief Complaint  Suture / Staple Removal (Left leg)    Subjective          Brendon Melara presents to [facility]  History of Present Illness  Need sutures removed- wound healed- no signs of infection  Suture / Staple Removal  The sutures were placed 11 to 14 days ago. He tried nothing since the wound repair. The treatment provided significant relief. There has been no drainage from the wound. There is no redness present. There is no swelling present. There is no pain present. He has no difficulty moving the affected extremity or digit.                Objective   No Known Allergies  Immunization History   Administered Date(s) Administered    COVID-19 (MODERNA) 1st,2nd,3rd Dose Monovalent 2021, 06/15/2021    Tdap 2016     Past Medical History:   Diagnosis Date    Allergic     Arthritis     Asthma     Irritable bowel syndrome     Low back pain     Visual impairment       Past Surgical History:   Procedure Laterality Date    EYE SURGERY        Social History     Socioeconomic History    Marital status:    Tobacco Use    Smoking status: Former     Current packs/day: 0.00     Average packs/day: 0.5 packs/day for 5.0 years (2.5 ttl pk-yrs)     Types: Cigarettes, Pipe, Cigars     Start date: 2002     Quit date: 2007     Years since quittin.6     Passive exposure: Past    Smokeless tobacco: Former     Quit date:    Vaping Use    Vaping status: Never Used   Substance and Sexual Activity    Alcohol use: Yes    Drug use: Never    Sexual activity: Defer        Current Outpatient Medications:     albuterol (ACCUNEB) 0.63 MG/3ML nebulizer solution, , Disp: , Rfl:     EPINEPHrine (EPIPEN) 0.3 MG/0.3ML solution auto-injector injection, USE AS DIRECTED FOR ACUTE ALLERGIC REACTION, Disp: , Rfl:     fexofenadine (Allegra Allergy) 180 MG tablet, Take 1 tablet by mouth Daily., Disp: 90 tablet, Rfl: 1   Family History   Problem Relation Age of Onset    Melanoma Mother      Depression Father           Vital Signs:   Vitals:    08/05/24 0908   BP: 110/80   Pulse: 93   Temp: 97.9 °F (36.6 °C)   SpO2: 99%   Weight: 77.7 kg (171 lb 4.8 oz)       Review of Systems   Constitutional:  Negative for fatigue and fever.   HENT:  Negative for sore throat.    Eyes:  Negative for visual disturbance.   Respiratory:  Negative for cough, chest tightness, shortness of breath and wheezing.    Cardiovascular:  Negative for chest pain, palpitations and leg swelling.   Gastrointestinal:  Negative for abdominal pain, diarrhea, nausea and vomiting.   Neurological:  Negative for dizziness, light-headedness and headaches.      Physical Exam  Vitals reviewed.   Constitutional:       Appearance: Normal appearance. He is well-developed.   HENT:      Head: Normocephalic and atraumatic.      Right Ear: External ear normal.      Left Ear: External ear normal.      Mouth/Throat:      Pharynx: No oropharyngeal exudate.   Eyes:      Conjunctiva/sclera: Conjunctivae normal.      Pupils: Pupils are equal, round, and reactive to light.   Cardiovascular:      Rate and Rhythm: Normal rate and regular rhythm.      Pulses: Normal pulses.      Heart sounds: Normal heart sounds. No murmur heard.     No friction rub. No gallop.   Pulmonary:      Effort: Pulmonary effort is normal.      Breath sounds: Normal breath sounds. No wheezing or rhonchi.   Abdominal:      General: Abdomen is flat. Bowel sounds are normal. There is no distension.      Palpations: Abdomen is soft. There is no mass.      Tenderness: There is no abdominal tenderness. There is no guarding or rebound.      Hernia: No hernia is present.   Musculoskeletal:         General: Normal range of motion.   Skin:     General: Skin is warm and dry.      Capillary Refill: Capillary refill takes less than 2 seconds.      Comments: Wound healed- closed, no redness, warmth, swelling, or discharge, or tenderness.   Neurological:      General: No focal deficit present.       Mental Status: He is alert and oriented to person, place, and time.      Cranial Nerves: No cranial nerve deficit.   Psychiatric:         Mood and Affect: Mood and affect normal.         Behavior: Behavior normal.         Thought Content: Thought content normal.         Judgment: Judgment normal.        Result Review :          Suture Removal    Date/Time: 8/5/2024 9:35 AM    Performed by: Johnnie Lozano MD  Authorized by: Johnnie Lozano MD  Body area: lower extremity  Location details: left lower leg  Wound Appearance: clean  Sutures Removed: 3  Post-removal: Steri-Strips applied  Facility: sutures placed in this facility  Patient tolerance: patient tolerated the procedure well with no immediate complications  Comments: Wound remained closed.            Assessment and Plan    Diagnoses and all orders for this visit:    1. Skin lesion of left leg (Primary)    Other orders  -     Suture Removal            Follow Up   Return if symptoms worsen or fail to improve.  Patient was given instructions and counseling regarding his condition or for health maintenance advice. Please see specific information pulled into the AVS if appropriate.

## 2024-10-31 ENCOUNTER — OFFICE VISIT (OUTPATIENT)
Dept: FAMILY MEDICINE CLINIC | Facility: CLINIC | Age: 49
End: 2024-10-31
Payer: OTHER GOVERNMENT

## 2024-10-31 VITALS
TEMPERATURE: 97.5 F | HEART RATE: 80 BPM | OXYGEN SATURATION: 100 % | DIASTOLIC BLOOD PRESSURE: 80 MMHG | BODY MASS INDEX: 26.29 KG/M2 | SYSTOLIC BLOOD PRESSURE: 118 MMHG | WEIGHT: 172.9 LBS

## 2024-10-31 DIAGNOSIS — N50.811 TESTICULAR PAIN, RIGHT: Primary | ICD-10-CM

## 2024-10-31 PROCEDURE — 99213 OFFICE O/P EST LOW 20 MIN: CPT | Performed by: FAMILY MEDICINE

## 2024-10-31 RX ORDER — DOXYCYCLINE 100 MG/1
100 CAPSULE ORAL 2 TIMES DAILY
Qty: 14 CAPSULE | Refills: 0 | Status: SHIPPED | OUTPATIENT
Start: 2024-10-31 | End: 2024-11-07

## 2024-10-31 RX ORDER — SACCHAROMYCES BOULARDII 250 MG
250 CAPSULE ORAL 2 TIMES DAILY
Qty: 20 CAPSULE | Refills: 0 | Status: SHIPPED | OUTPATIENT
Start: 2024-10-31 | End: 2024-11-10

## 2024-10-31 NOTE — PROGRESS NOTES
Chief Complaint  Testicle Pain (Right testicle pain x one month )    Subjective          Brendon Melara presents to [facility]  History of Present Illness  Right testicle pain x 3 weeks- sudden onset- worsening symptoms             Objective   No Known Allergies  Immunization History   Administered Date(s) Administered    COVID-19 (MODERNA) 1st,2nd,3rd Dose Monovalent 2021, 06/15/2021    Tdap 2016     Past Medical History:   Diagnosis Date    Allergic     Arthritis     Asthma     Irritable bowel syndrome     Low back pain     Visual impairment       Past Surgical History:   Procedure Laterality Date    EYE SURGERY        Social History     Socioeconomic History    Marital status:    Tobacco Use    Smoking status: Former     Current packs/day: 0.00     Average packs/day: 0.5 packs/day for 5.0 years (2.5 ttl pk-yrs)     Types: Cigarettes, Pipe, Cigars     Start date: 2002     Quit date: 2007     Years since quittin.8     Passive exposure: Past    Smokeless tobacco: Former     Quit date:    Vaping Use    Vaping status: Never Used   Substance and Sexual Activity    Alcohol use: Yes    Drug use: Never    Sexual activity: Defer        Current Outpatient Medications:     albuterol (ACCUNEB) 0.63 MG/3ML nebulizer solution, , Disp: , Rfl:     EPINEPHrine (EPIPEN) 0.3 MG/0.3ML solution auto-injector injection, USE AS DIRECTED FOR ACUTE ALLERGIC REACTION, Disp: , Rfl:     fexofenadine (Allegra Allergy) 180 MG tablet, Take 1 tablet by mouth Daily., Disp: 90 tablet, Rfl: 1    doxycycline (VIBRAMYCIN) 100 MG capsule, Take 1 capsule by mouth 2 (Two) Times a Day for 7 days., Disp: 14 capsule, Rfl: 0    saccharomyces boulardii (Florastor) 250 MG capsule, Take 1 capsule by mouth 2 (Two) Times a Day for 10 days., Disp: 20 capsule, Rfl: 0   Family History   Problem Relation Age of Onset    Melanoma Mother     Depression Father           Vital Signs:   Vitals:     10/31/24 1539   BP: 118/80   Pulse: 80   Temp: 97.5 °F (36.4 °C)   SpO2: 100%   Weight: 78.4 kg (172 lb 14.4 oz)       Review of Systems   Physical Exam  Vitals reviewed. Exam conducted with a chaperone present.   Constitutional:       Appearance: Normal appearance. He is well-developed.   HENT:      Head: Normocephalic and atraumatic.      Right Ear: External ear normal.      Left Ear: External ear normal.      Mouth/Throat:      Pharynx: No oropharyngeal exudate.   Eyes:      Conjunctiva/sclera: Conjunctivae normal.      Pupils: Pupils are equal, round, and reactive to light.   Cardiovascular:      Rate and Rhythm: Normal rate and regular rhythm.      Pulses: Normal pulses.      Heart sounds: Normal heart sounds. No murmur heard.     No friction rub. No gallop.   Pulmonary:      Effort: Pulmonary effort is normal.      Breath sounds: Normal breath sounds. No wheezing or rhonchi.   Abdominal:      General: Abdomen is flat. Bowel sounds are normal. There is no distension.      Palpations: Abdomen is soft. There is no mass.      Tenderness: There is no abdominal tenderness. There is no guarding or rebound.      Hernia: No hernia is present.   Genitourinary:     Penis: Normal.       Comments: Right upper testicular mild tenderness, no masses palpated, testicle not warm, swollen, or red, no hernias palpated.  Musculoskeletal:         General: Normal range of motion.   Skin:     General: Skin is warm and dry.      Capillary Refill: Capillary refill takes less than 2 seconds.   Neurological:      General: No focal deficit present.      Mental Status: He is alert and oriented to person, place, and time.      Cranial Nerves: No cranial nerve deficit.   Psychiatric:         Mood and Affect: Mood and affect normal.         Behavior: Behavior normal.         Thought Content: Thought content normal.         Judgment: Judgment normal.        Result Review :                 Assessment and Plan    Diagnoses and all orders for  this visit:    1. Testicular pain, right (Primary)  -     US Scrotum & Testicles; Future  -     doxycycline (VIBRAMYCIN) 100 MG capsule; Take 1 capsule by mouth 2 (Two) Times a Day for 7 days.  Dispense: 14 capsule; Refill: 0  -     saccharomyces boulardii (Florastor) 250 MG capsule; Take 1 capsule by mouth 2 (Two) Times a Day for 10 days.  Dispense: 20 capsule; Refill: 0            Follow Up   Return if symptoms worsen or fail to improve.  Patient was given instructions and counseling regarding his condition or for health maintenance advice. Please see specific information pulled into the AVS if appropriate.

## 2024-11-08 ENCOUNTER — HOSPITAL ENCOUNTER (OUTPATIENT)
Dept: ULTRASOUND IMAGING | Facility: HOSPITAL | Age: 49
Discharge: HOME | End: 2024-11-08
Admitting: FAMILY MEDICINE
Payer: OTHER GOVERNMENT

## 2024-11-08 DIAGNOSIS — N50.811 TESTICULAR PAIN, RIGHT: ICD-10-CM

## 2024-11-08 PROCEDURE — 76870 US EXAM SCROTUM: CPT

## 2024-11-18 DIAGNOSIS — N50.811 TESTICULAR PAIN, RIGHT: Primary | ICD-10-CM

## 2025-03-13 ENCOUNTER — OFFICE VISIT (OUTPATIENT)
Dept: UROLOGY | Age: 50
End: 2025-03-13
Payer: OTHER GOVERNMENT

## 2025-03-13 VITALS — WEIGHT: 172.84 LBS | RESPIRATION RATE: 16 BRPM | BODY MASS INDEX: 26.2 KG/M2 | HEIGHT: 68 IN

## 2025-03-13 DIAGNOSIS — N50.82 SCROTAL PAIN: Primary | ICD-10-CM

## 2025-03-13 NOTE — PROGRESS NOTES
Chief Complaint: Testicle Pain    Subjective         History of Present Illness  Brendon Melara is a 49 y.o. male presents to [facility] to be seen for testicular pain.    History of Present Illness  The patient is a 49-year-old male who presents for evaluation of testicular pain.    He was referred to our clinic due to persistent testicular discomfort, which has been ongoing for over a year. An ultrasound revealed the presence of two epididymal cysts on the right testicle and orchitis on the left. He did have antibiotic treatment for the orchitis, he reports improvement in his symptoms. He experiences mild, intermittent discomfort in the right testicle.     His urinary symptoms include urination once or twice at night, which he attributes to high fluid intake. He reports no hematuria or any history of surgical interventions involving the bladder, kidneys, prostate, or testicles. He also reports no cardiac or pulmonary issues and is not on any anticoagulant therapy.     SOCIAL HISTORY  He does not smoke.    FAMILY HISTORY  There is no family history of bladder, kidney, or prostate cancers.          US SCROTUM AND TESTICLES     Date of Exam: 11/8/2024 1:38 PM EST     Indication: right upper testicle pain.     Comparison: No comparisons available.     Technique: Multiple sonographic images of the scrotum were obtained in transverse and longitudinal planes. Grayscale and color Doppler duplex techniques were utilized.  Doppler spectral analysis was performed.     Findings:  There is a right epididymal head cyst measuring up to 1.2 cm. The right testicle measures 4.5 cm in length. It is morphologically normal with no intratesticular mass. There is a 7 mm left epididymal head cyst. The left testicle measures 4.8 cm in length.   It is morphologically normal with no intratesticular mass. Both testicles show perfusion by Doppler. The left side is hyperemic relative to the right suggesting left side  "orchitis. No hydrocele is identified on either side.     IMPRESSION:     1. Both testicles are morphologically normal with no intratesticular masses.  2. The left testicle is hyperemic relative to the right suggesting left orchitis.  3. Small left epididymal head cyst with a larger right epididymal head cyst measuring up to 1.2 cm           Electronically Signed: Johnnie Issa MD    2024 4:01 AM EST       Objective     Past Medical History:   Diagnosis Date    Allergic     Arthritis     Asthma     Irritable bowel syndrome     Low back pain     Visual impairment        Past Surgical History:   Procedure Laterality Date    EYE SURGERY           Current Outpatient Medications:     albuterol (ACCUNEB) 0.63 MG/3ML nebulizer solution, , Disp: , Rfl:     EPINEPHrine (EPIPEN) 0.3 MG/0.3ML solution auto-injector injection, USE AS DIRECTED FOR ACUTE ALLERGIC REACTION, Disp: , Rfl:     fexofenadine (Allegra Allergy) 180 MG tablet, Take 1 tablet by mouth Daily., Disp: 90 tablet, Rfl: 1    No Known Allergies     Family History   Problem Relation Age of Onset    Melanoma Mother     Depression Father        Social History     Socioeconomic History    Marital status:    Tobacco Use    Smoking status: Former     Current packs/day: 0.00     Average packs/day: 0.5 packs/day for 5.0 years (2.5 ttl pk-yrs)     Types: Cigarettes, Pipe, Cigars     Start date: 2002     Quit date: 2007     Years since quittin.2     Passive exposure: Past    Smokeless tobacco: Former     Quit date:    Vaping Use    Vaping status: Never Used   Substance and Sexual Activity    Alcohol use: Yes    Drug use: Never    Sexual activity: Defer       Vital Signs:   Resp 16   Ht 172.7 cm (68\")   Wt 78.4 kg (172 lb 13.5 oz)   BMI 26.28 kg/m²      Physical Exam  Vitals reviewed.   Constitutional:       Appearance: Normal appearance.   Neurological:      General: No focal deficit present.      Mental Status: He is alert and oriented to " person, place, and time.   Psychiatric:         Mood and Affect: Mood normal.         Behavior: Behavior normal.          Result Review :   The following data was reviewed by: JERMAINE Jaramillo on 03/13/2025:         Results  Imaging  Ultrasound showed two epididymal cysts on the right testicle and orchitis on the left.      Procedures        Assessment and Plan    Diagnoses and all orders for this visit:    1. Scrotal pain (Primary)        Assessment & Plan  1. Testicular pain.  The ultrasound results indicate the presence of two epididymal cysts, which are not typically a cause for concern. These cysts could potentially be contributing to his discomfort. However, surgical removal is not recommended as it may not necessarily alleviate the pain and could introduce complications such as bleeding, infection, and increased pain. The ultrasound also revealed an infection, which has been treated with antibiotics. He was advised to manage his pain with anti-inflammatory medications when necessary. The use of supportive underwear was recommended, and he was instructed to elevate the scrotum during periods of rest to facilitate fluid reabsorption into the body. If the pain becomes more consistent and severe, a referral to a scrotal pain specialist will be considered for further evaluation and potential intervention.    Follow-up  The patient will follow up as needed.      Follow Up   No follow-ups on file.  Patient was given instructions and counseling regarding his condition or for health maintenance advice. Please see specific information pulled into the AVS if appropriate.         This document has been electronically signed by JERMAINE Jaramillo  March 13, 2025 09:37 EDT     Patient or patient representative verbalized consent for the use of Ambient Listening during the visit with  JERMAINE Jaramillo for chart documentation. 3/13/2025  09:37 EDT

## 2025-03-21 NOTE — TELEPHONE ENCOUNTER
Pt sent message thru my chart asking if he is being referred to PT for left shoulder   Please advise   Attending Attestation (For Attendings USE Only)...

## 2025-04-15 ENCOUNTER — OFFICE VISIT (OUTPATIENT)
Age: 50
End: 2025-04-15
Payer: OTHER GOVERNMENT

## 2025-04-15 VITALS
DIASTOLIC BLOOD PRESSURE: 72 MMHG | WEIGHT: 172 LBS | OXYGEN SATURATION: 100 % | HEART RATE: 80 BPM | BODY MASS INDEX: 26.15 KG/M2 | SYSTOLIC BLOOD PRESSURE: 110 MMHG

## 2025-04-15 DIAGNOSIS — F32.1 CURRENT MODERATE EPISODE OF MAJOR DEPRESSIVE DISORDER WITHOUT PRIOR EPISODE: Primary | ICD-10-CM

## 2025-04-15 DIAGNOSIS — Z63.0 MARITAL PROBLEMS: ICD-10-CM

## 2025-04-15 PROCEDURE — 99213 OFFICE O/P EST LOW 20 MIN: CPT | Performed by: NURSE PRACTITIONER

## 2025-04-15 SDOH — SOCIAL STABILITY - SOCIAL INSECURITY: PROBLEMS IN RELATIONSHIP WITH SPOUSE OR PARTNER: Z63.0

## 2025-04-15 NOTE — PROGRESS NOTES
"[facility]  Follow-up Visit    Chief Complaint: \"depression, mood swings mainly\"      History of Present Illness: Brendon Melara is a 50 y.o. male who presents today for follow-up regarding MDD and irritable mood. Last seen: 07/18/24 at which time declined medications and wanted to start therapy. Brendon Melara presents unaccompanied in no acute distress and engages with me appropriately. Psychotropic regimen perceived to be none.     Today the patient feels that his depression has worsened since last seeing provider. Patient given therapy referral and states they had a wait list so he didn't make an appointment. When he called back months later they were booked up. Patient reports he is having marital issues and this has increased his depression. Patient denies any anxiety symptoms or panic. Patient states he has been working out to relieve stress and depression and that has helped him sleep. Patient also taking natural supplements to help increase mood. Patient declines medication at this time. Patient reports he wasn't able to get into . Thought process and content are devoid of overt aberration suggestive of acute laila/psychosis. The patient denies SI/HI/AVH.   - contextual changes: marital issues  - sleep: ok  - appetite: baseline    Education  I have reviewed all screening tools and interpretation with the patient. I have counseled the patient with regard to diagnoses and the recommended treatment regimen as documented below. Patient acknowledges the diagnoses per my rendered interpretation. Patient demonstrates awareness/understanding of viable alternatives for treatment as well as potential risks, benefits, and side effects associated with this regimen and is amenable to proceed in this fashion.    Assignment: exercise 30 mintues 2 - 3 times a week, healthy diet    Psychotherapy  - Time: 3 minutes  - interventions employed: the therapeutic alliance was strengthened " to encourage the patient to express their thoughts and feelings freely. Esteem building was enhanced through praise, reassurance, normalizing/challenging, and encouragement as appropriate. General coping skills were enhanced to build distress tolerance skills and emotional regulation. Allowed patient to freely discuss issues without interruption or judgement with unconditional positive regard, active listening skills, and empathy. Provided a safe, confidential environment to facilitate the development of a positive therapeutic relationship and encourage open, honest communication. Assisted patient in processing session content; acknowledged and normalized/addressed, as appropriate, patient’s thoughts, feelings, and concerns by utilizing a person-centered approach in efforts to build appropriate rapport and a positive therapeutic relationship.   - Diagnoses: see assessment and plan below  - Symptoms: see subjective above  - Goals: - patient: identify and challenge negative thought pattern    - provider: challenge patterns of living contributing to symptom burden, strengthen defenses, promote problem solving skills, restore adaptive functioning, and provide symptom relief.  - Treatment plan: continue supportive psychotherapy in subsequent appointments to provide symptom relief; work with patient to identify and challenge negative beliefs, see assessment and plan below for additional details  - functional status: fair  - mental status exam: as below  - prognosis: fair  - progress: needs improvement  - short term goal: Medication adherence and improvement of symptoms per patient report.  - long term goal: Overall improvement in mood and functioning per patient self report.    Psychiatric History:  Diagnoses: MDD  Outpatient history: none  Inpatient history: denies  Medication trials: denies  Other treatment modalities: tried [facility] online for 1 month, didn't like online therapy  Presenting regimen: none  Self  harm: denies  Suicide attempts: denies  Auditory hallucinations: denies  Visual hallucinations: denies     Substance Abuse History:   Types/methods/frequency: alcohol (whiskey) use 2 - 3 drinks on average, will drink when mood is low, will not go past 3 drinks     Social History:  Residence: lives house with wife, 5 kids (13 - 22)  Vocation: retired , HR supervisor [employer]  Education: Bachelors degree, some graduate courses  Pertinent developmental history: denies  Trauma: , loss of father  Pertinent legal history: denies  Protective factors: wife and children  Hobbies/interests: working out, spiritual activities  Episcopal: Wiccan  Exercise: boxing, release emotions  Dietary habits: since 2024, lost 30 lbs, hasn't been as hungry  Sleep issues/hygiene: no pertinent issues  Social habits: defer  Sunlight: no concern for under-exposure  Caffeine intake: 3 c coffee in the morning, decaf tea, no soda  Hydration habits: no pertinent issues   history: retired      Family History:  Family history of psychiatric disorders: father, brother, sister - depression  Suicide Attempts: denies  Suicide Completions: denies     Access to Firearms: yes, locked in a safe     Social History     Socioeconomic History    Marital status:    Tobacco Use    Smoking status: Former     Current packs/day: 0.00     Average packs/day: 0.5 packs/day for 5.0 years (2.5 ttl pk-yrs)     Types: Cigarettes, Pipe, Cigars     Start date: 2002     Quit date: 2007     Years since quittin.2     Passive exposure: Past    Smokeless tobacco: Former     Quit date:    Vaping Use    Vaping status: Never Used   Substance and Sexual Activity    Alcohol use: Yes    Drug use: Never    Sexual activity: Defer     Tobacco use counseling/intervention:  former smoker; Currently remission by transtheoretical model.     PHQ-9 Depression Screening  PHQ-9 Total Score: 17    Little interest or pleasure in doing things?  Almost all   Feeling down, depressed, or hopeless? Over half   PHQ-2 Total Score 5   Trouble falling or staying asleep, or sleeping too much? Over half   Feeling tired or having little energy? Over half   Poor appetite or overeating? Over half   Feeling bad about yourself - or that you are a failure or have let yourself or your family down? Over half   Trouble concentrating on things, such as reading the newspaper or watching television? Over half   Moving or speaking so slowly that other people could have noticed? Or the opposite - being so fidgety or restless that you have been moving around a lot more than usual? Over half   Thoughts that you would be better off dead, or of hurting yourself in some way? Not at all   PHQ-9 Total Score 17   If you checked off any problems, how difficult have these problems made it for you to do your work, take care of things at home, or get along with other people? Somewhat difficult     Change in PHQ-9 since last measure: +10 (7)    LAURIE-7  Feeling nervous, anxious or on edge: Not at all  Not being able to stop or control worrying: Not at all  Worrying too much about different things: Not at all  Trouble Relaxing: More than half the days  Being so restless that it is hard to sit still: Not at all  Feeling afraid as if something awful might happen: Not at all  Becoming easily annoyed or irritable: More than half the days  LAURIE 7 Total Score: 4  If you checked any problems, how difficult have these problems made it for you to do your work, take care of things at home, or get along with other people: Somewhat difficult    Change in LAURIE-7 since last measure: -3 (7)    Problem List:  Patient Active Problem List   Diagnosis    Tear of left glenoid labrum    Acute pain of left shoulder    Arthritis    Asthma    Irritable bowel syndrome    Seasonal allergic rhinitis    Former tobacco use     Allergy:   No Known Allergies     Discontinued Medications:  There are no discontinued  "medications.    Current Medications:   Current Outpatient Medications   Medication Sig Dispense Refill    albuterol (ACCUNEB) 0.63 MG/3ML nebulizer solution       EPINEPHrine (EPIPEN) 0.3 MG/0.3ML solution auto-injector injection USE AS DIRECTED FOR ACUTE ALLERGIC REACTION      fexofenadine (Allegra Allergy) 180 MG tablet Take 1 tablet by mouth Daily. 90 tablet 1     No current facility-administered medications for this visit.     Past Medical History:  Past Medical History:   Diagnosis Date    Allergic     Arthritis     Asthma     Irritable bowel syndrome     Low back pain     Visual impairment      Past Surgical History:  Past Surgical History:   Procedure Laterality Date    EYE SURGERY       Mental Status Exam:   Appearance: well-groomed, normal habitus, age-appropriate, and sits upright   Behavior: calm, appropriate in demeanor, and appropriate eye-contact  Mood/affect: \"melancholy\", full  Speech:  within expected variance, appropriate rate, appropriate rhythm, and appropriate tone  Thought Process: linear and logical  Thought Content: coherent and devoid of overt delusions/perceptual disturbances   SI/HI: denies both SI and HI, exhibits future-orientation, self-advocates appropriately, no regular self-harm, and no appreciable intent  Memory: no overt deficits  Orientation: oriented to person/place/time/situation  Concentration: appropriate during interview  Intellectual capacity: presumptively average  Insight: fair by given history/exam  Judgment: fair  Psychomotor: no appreciable latency/retardation/agitation/tremor  Gait: WNL and stable    Review of Systems:   Review of Systems   Neurological:  Negative for dizziness.   Psychiatric/Behavioral:  Positive for dysphoric mood. Negative for confusion, hallucinations, self-injury, sleep disturbance and suicidal ideas. The patient is not nervous/anxious.      Vital Signs:   /72   Pulse 80   Wt 78 kg (172 lb)   SpO2 100%   BMI 26.15 kg/m²    Lab Results: " "  No visits with results within 6 Month(s) from this visit.   Latest known visit with results is:   Procedure visit on 07/22/2024   Component Date Value Ref Range Status    Case Report 07/22/2024    Final                    Value:Surgical Pathology Report                         Case: [id number]                                  Authorizing Provider:  Johnnie Lozano MD   Collected:           07/22/2024 11:02 AM          Ordering Location:     [facility]     Received:            07/22/2024 11:02 AM                                 [facility]                                                        Pathologist:           Grayson Nevarez MD                                                            Specimen:    Leg, Left                                                                                  Clinical Information 07/22/2024    Final                    Value:Skin lesion of left leg    Final Diagnosis 07/22/2024    Final                    Value:Skin, left leg lesion, biopsy:                           - Dermatofibroma    Gross Description 07/22/2024    Final                    Value:1. Leg, Left.                          Received in formalin and labeled \"left leg\" is a 1.0 x 0.5 cm skin shave.                            The margin is inked blue and serially sectioned.  The specimen is entirely                           submitted in 1 cassette.                           NARGIS    Microscopic Description 07/22/2024    Final                    Value:Microscopic examination performed.     EKG Results:  No orders to display    Imaging Results:  No Images in the past 120 days found.    ASSESSMENT AND PLAN:    ICD-10-CM ICD-9-CM   1. Current moderate episode of major depressive disorder without prior episode  F32.1 296.22   2. Marital problems  Z63.0 V61.10       50 y.o. male who presents today for follow up regarding MDD, marital problems. We have discussed the history and interpreted diagnoses as " above as well as the treatment plan below, including potential of risk/benefits/side effects of the recommended regimen of which the patient demonstrates understanding. Patient is agreeable to call 911 or go to the nearest ER should he become concerned for his own safety and/or the safety of those around his. There are no overt changes on exam today compared to most recent evaluation.    Medication regimen: no change, no medication regimen at this time; patient is advised to notify provider if he would like to start medication, Patient is agreeable to plan.   Risk Assessment: protracted risk is low, imminent risk is low. Risk factors include: mood disorder and recent/ongoing psychosocial stressors. Protective factors include: intact reality testing, no present SI, no stated history of suicide attempts or self-harm, patient's exhibited future-orientation, and patient's cooperation with care. Do note that this is subject to change with the Buddhism of new stressors, treatment non-adherence, use of substances, and/or new medical ails.  Monitoring: no lab work to review, no labs ordered; PHQ-9 today is 17/27, LAURIE-7 today is 4/21  Therapy: referred to Kevin Kapadia - patient to schedule appointment  Follow-up: prn  Treatment plan: due  Communications: Patient given 3 therapist options. Kevin Kapadia, Dasia Méndez, and [facility], patient to call to change referral if Kevin Kapadia unavailable    TREATMENT PLAN/GOALS: challenge patterns of living conducive to symptom burden, implement recommended regimen as above with augmentative, intermittent supportive psychotherapy to reduce symptom burden. Patient acknowledged and verbally consented to continue treatment. The importance of adherence to the recommended treatment and interval follow-up appointments was again emphasized today: patient has good treatment adherence per given history. Patient was today reminded to limit daily caffeine intake, hydrate appropriately, eat healthy  and nutritious foods, engage sleep hygiene measures, engage appropriate exposure to sunlight, engage with hobbies in balance with life necessities, and exercise appropriate to their capacity to do so.    Billing: Additionally, I provided 3 minutes of dedicated psychotherapy to the patient, distinct from E/M services, as documented above. Start time: 0808. Stop time: 0811.    Parts of this note are electronic transcriptions/translations of spoken language to printed text using the Dragon Dictation system.    Electronically signed by JERMAINE Pagan, 04/15/25, 08:16 EDT  Answers submitted by the patient for this visit:  Depression (Submitted on 4/15/2025)  Chief Complaint: Depression  Visit: follow-up  Frequency: constantly  Severity: moderate  excessive worry: No  insomnia: Yes  irritability: Yes  malaise/fatigue: No  obsessions: Yes  hypersomnia: No  difficulty controlling mood: Yes  difficulty staying asleep: Yes  difficulty falling asleep: Yes  Hours of sleep per night: 6 Hours  Medication compliance: 0-25%

## 2025-05-06 ENCOUNTER — OFFICE VISIT (OUTPATIENT)
Dept: FAMILY MEDICINE CLINIC | Facility: CLINIC | Age: 50
End: 2025-05-06
Payer: OTHER GOVERNMENT

## 2025-05-06 VITALS
DIASTOLIC BLOOD PRESSURE: 74 MMHG | HEART RATE: 78 BPM | OXYGEN SATURATION: 99 % | BODY MASS INDEX: 26.33 KG/M2 | SYSTOLIC BLOOD PRESSURE: 116 MMHG | TEMPERATURE: 96.9 F | HEIGHT: 68 IN | WEIGHT: 173.7 LBS

## 2025-05-06 DIAGNOSIS — R53.83 FATIGUE, UNSPECIFIED TYPE: Primary | ICD-10-CM

## 2025-05-06 DIAGNOSIS — J06.9 UPPER RESPIRATORY TRACT INFECTION, UNSPECIFIED TYPE: ICD-10-CM

## 2025-05-06 DIAGNOSIS — J02.9 PHARYNGITIS, UNSPECIFIED ETIOLOGY: ICD-10-CM

## 2025-05-06 LAB
EXPIRATION DATE: NORMAL
FLUAV AG UPPER RESP QL IA.RAPID: NOT DETECTED
FLUBV AG UPPER RESP QL IA.RAPID: NOT DETECTED
INTERNAL CONTROL: NORMAL
Lab: NORMAL
SARS-COV-2 AG UPPER RESP QL IA.RAPID: NOT DETECTED

## 2025-05-06 PROCEDURE — 99213 OFFICE O/P EST LOW 20 MIN: CPT | Performed by: FAMILY MEDICINE

## 2025-05-06 PROCEDURE — 87428 SARSCOV & INF VIR A&B AG IA: CPT | Performed by: FAMILY MEDICINE

## 2025-07-10 ENCOUNTER — TELEPHONE (OUTPATIENT)
Dept: ORTHOPEDIC SURGERY | Facility: CLINIC | Age: 50
End: 2025-07-10
Payer: OTHER GOVERNMENT

## 2025-07-10 NOTE — TELEPHONE ENCOUNTER
LVM LETTING PT KNOW WE NEED NEW  REFERRAL FOR APPT TOMORROW AND FOR HIM TO CONTACT PCP FOR THIS. OK FOR HUB TO RELAY.

## 2025-07-11 ENCOUNTER — OFFICE VISIT (OUTPATIENT)
Dept: ORTHOPEDIC SURGERY | Facility: CLINIC | Age: 50
End: 2025-07-11
Payer: OTHER GOVERNMENT

## 2025-07-11 VITALS
OXYGEN SATURATION: 97 % | SYSTOLIC BLOOD PRESSURE: 122 MMHG | DIASTOLIC BLOOD PRESSURE: 80 MMHG | BODY MASS INDEX: 26.22 KG/M2 | WEIGHT: 173 LBS | HEIGHT: 68 IN | HEART RATE: 66 BPM

## 2025-07-11 DIAGNOSIS — S83.512A RUPTURE OF ANTERIOR CRUCIATE LIGAMENT OF LEFT KNEE, INITIAL ENCOUNTER: ICD-10-CM

## 2025-07-11 DIAGNOSIS — M25.562 LEFT KNEE PAIN, UNSPECIFIED CHRONICITY: Primary | ICD-10-CM

## 2025-07-11 NOTE — PROGRESS NOTES
"Chief Complaint  Follow-up of the Left Knee    Subjective          Brendon Melara presents to [facility] for a follow up for his left knee.     History of Present Illness    The patient presents here today for a follow up for his left knee. He has been treating his left knee ACL tear conservatively. He was last seen almost two years ago where we discussed operative treatment options. He has been doing home exercises and over the counter medications for pain control. He denies any instability, buckling or locking to his knee.     No Known Allergies     Social History     Socioeconomic History    Marital status:    Tobacco Use    Smoking status: Former     Current packs/day: 0.00     Average packs/day: 0.5 packs/day for 5.0 years (2.5 ttl pk-yrs)     Types: Cigarettes, Pipe, Cigars     Start date: 2002     Quit date: 2007     Years since quittin.5     Passive exposure: Past    Smokeless tobacco: Former     Quit date:    Vaping Use    Vaping status: Never Used   Substance and Sexual Activity    Alcohol use: Yes    Drug use: Never    Sexual activity: Defer        I reviewed the patient's chief complaint, history of present illness, review of systems, past medical history, surgical history, family history, social history, medications, and allergy list.     REVIEW OF SYSTEMS    Constitutional: Denies fevers, chills, weight loss  Cardiovascular: Denies chest pain, shortness of breath  Skin: Denies rashes, acute skin changes  Neurologic: Denies headache, loss of consciousness  MSK: left knee pain       Objective   Vital Signs:   /80   Pulse 66   Ht 172.7 cm (68\")   Wt 78.5 kg (173 lb)   SpO2 97%   BMI 26.30 kg/m²     Body mass index is 26.3 kg/m².    Physical Exam    General: Alert. No acute distress.   Left lower extremity: Increase translation to Lachman's, non tender to the medial joint line  and lateral joint line, no pain with Larry's, full extension, " flexion  to 120 degrees, stable to varus/valgus stress, mild crepitus with range of motion, distal neurovascularly intact, positive  pulses, positive EHL, FHL, GS, and TA. Sensation intact to all 5 nerves of the foot.     Procedures    Imaging Results (Most Recent)       Procedure Component Value Units Date/Time    XR Knee 4+ View Left [[id number]] Resulted: 07/11/25 1532     Updated: 07/11/25 1532    Narrative:      Indications: Left knee pain, history of ACL tear    Views: Weightbearing AP, PA flexion, lateral, sunrise left knee    Findings: Mild degenerative changes present.  No fractures noted.    Comparative Data: Comparative data found and reviewed today                     Assessment and Plan        XR Knee 4+ View Left  Result Date: 7/11/2025  Narrative: Indications: Left knee pain, history of ACL tear Views: Weightbearing AP, PA flexion, lateral, sunrise left knee Findings: Mild degenerative changes present.  No fractures noted. Comparative Data: Comparative data found and reviewed today       Diagnoses and all orders for this visit:    1. Left knee pain, unspecified chronicity (Primary)  -     XR Knee 4+ View Left    2. Rupture of anterior cruciate ligament of left knee, initial encounter      The patient presents here today for a follow up for his left knee.X-rays were obtained in the office today and these were reviewed today.     He is overall doing well and will continue home exercises, bracing and over the counter medications for pain control.     Call or return if worsening symptoms.    Scribed for Ki Morgan MD by Wendy Ma  07/11/2025   11:00 EDT         Follow Up       PRN     Patient was given instructions and counseling regarding his condition or for health maintenance advice. Please see specific information pulled into the AVS if appropriate.       I have personally performed the services described in this document as scribed by the above individual and it is both accurate and complete.  Ki Morgan MD 07/12/25 08:41 EDT

## 2025-07-30 ENCOUNTER — OFFICE VISIT (OUTPATIENT)
Dept: FAMILY MEDICINE CLINIC | Facility: CLINIC | Age: 50
End: 2025-07-30
Payer: OTHER GOVERNMENT

## 2025-07-30 VITALS
WEIGHT: 174 LBS | DIASTOLIC BLOOD PRESSURE: 74 MMHG | SYSTOLIC BLOOD PRESSURE: 112 MMHG | HEIGHT: 68 IN | BODY MASS INDEX: 26.37 KG/M2 | TEMPERATURE: 97.7 F | OXYGEN SATURATION: 99 % | HEART RATE: 89 BPM

## 2025-07-30 DIAGNOSIS — R68.84 JAW PAIN: ICD-10-CM

## 2025-07-30 DIAGNOSIS — G89.29 CHRONIC PAIN OF LEFT WRIST: ICD-10-CM

## 2025-07-30 DIAGNOSIS — M25.532 CHRONIC PAIN OF LEFT WRIST: ICD-10-CM

## 2025-07-30 DIAGNOSIS — H66.001 NON-RECURRENT ACUTE SUPPURATIVE OTITIS MEDIA OF RIGHT EAR WITHOUT SPONTANEOUS RUPTURE OF TYMPANIC MEMBRANE: Primary | ICD-10-CM

## 2025-07-30 DIAGNOSIS — M25.632 DECREASED RANGE OF MOTION OF LEFT WRIST: ICD-10-CM

## 2025-07-30 PROCEDURE — 99214 OFFICE O/P EST MOD 30 MIN: CPT

## 2025-07-30 RX ORDER — PREDNISONE 20 MG/1
20 TABLET ORAL DAILY
Qty: 3 TABLET | Refills: 0 | Status: SHIPPED | OUTPATIENT
Start: 2025-07-30 | End: 2025-08-02

## 2025-07-30 RX ORDER — AZITHROMYCIN 250 MG/1
TABLET, FILM COATED ORAL
Qty: 6 TABLET | Refills: 0 | Status: SHIPPED | OUTPATIENT
Start: 2025-07-30

## 2025-07-30 RX ORDER — CYCLOBENZAPRINE HCL 10 MG
10 TABLET ORAL NIGHTLY PRN
Qty: 30 TABLET | Refills: 0 | Status: SHIPPED | OUTPATIENT
Start: 2025-07-30

## 2025-07-30 NOTE — PROGRESS NOTES
"Chief Complaint  Earache (Right ear ache x 2 weeks), Jaw Pain (Pain radiating into right jaw), and Wrist Pain (Left wrist pain)    The PHQ has not been completed during this encounter.         History of Present Illness:  Brendon Melara is a 50 y.o. male who presents to [facility] with a past medical history of  Past Medical History:   Diagnosis Date    Allergic     Arthritis     Asthma     Irritable bowel syndrome     Low back pain     Visual impairment         History of Present Illness  The patient is a 50-year-old male who presents today for ear pain and jaw pain.    He reports experiencing right jaw pain, which he first noticed as a dull earache approximately 1.5 weeks ago. The pain subsided but returned 2 days ago, intensifying during chewing. He describes the sensation as a slight pop and questions if it could be due to an infection causing nerve pain. He has no history of TMJ or other related issues. He also reports no fever or sore throat. The onset of these symptoms was after a kayaking trip in [facility], where he did not submerge his head in the water.    Additionally, he mentions a wrist injury sustained about a year ago, which he believes was caused by lifting dumbbells. He attempted to let it heal naturally, but certain movements seem to aggravate the injury, leading him to suspect an unresolved tear. He has not had an MRI scan of the wrist. The pain is described as sharp, and he experiences difficulty moving his wrist when it is twisted incorrectly. He reports no numbness or tingling in his fingers.    Social History:  Occupations:       Objective   Vital Signs:   Vitals:    07/30/25 1417   BP: 112/74   Pulse: 89   Temp: 97.7 °F (36.5 °C)   SpO2: 99%   Weight: 78.9 kg (174 lb)   Height: 172.7 cm (68\")     Body mass index is 26.46 kg/m².    Wt Readings from Last 3 Encounters:   07/30/25 78.9 kg (174 lb)   07/11/25 78.5 kg (173 lb)   05/06/25 78.8 kg (173 " lb 11.2 oz)     BP Readings from Last 3 Encounters:   07/30/25 112/74   07/11/25 122/80   05/06/25 116/74       Health Maintenance   Topic Date Due    HEPATITIS C SCREENING  Never done    ANNUAL PHYSICAL  07/12/2025    Pneumococcal Vaccine 0-49 (1 of 2 - PCV) 11/02/2025 (Originally 4/2/1994)    COVID-19 Vaccine (3 - 2024-25 season) 11/06/2025 (Originally 9/1/2024)    INFLUENZA VACCINE  10/01/2025    TDAP/TD VACCINES (2 - Td or Tdap) 09/06/2026    COLORECTAL CANCER SCREENING  10/02/2028       Review of Systems   Physical Exam  Vitals reviewed.   Constitutional:       Appearance: Normal appearance.   HENT:      Head:      Jaw: Tenderness and pain on movement present.        Right Ear: Tympanic membrane is injected and erythematous.   Eyes:      Pupils: Pupils are equal, round, and reactive to light.   Cardiovascular:      Rate and Rhythm: Normal rate and regular rhythm.   Pulmonary:      Effort: Pulmonary effort is normal.      Breath sounds: Normal breath sounds.   Musculoskeletal:      Left wrist: Tenderness present. Decreased range of motion.   Skin:     General: Skin is warm and dry.   Neurological:      General: No focal deficit present.      Mental Status: He is alert and oriented to person, place, and time.   Psychiatric:         Mood and Affect: Mood normal.            Result Review :  The following data was reviewed by: JERMAINE Rowe on 07/30/2025:  No visits with results within 1 Month(s) from this visit.   Latest known visit with results is:   Office Visit on 05/06/2025   Component Date Value    SARS Antigen 05/06/2025 Not Detected     Influenza A Antigen GABE 05/06/2025 Not Detected     Influenza B Antigen GABE 05/06/2025 Not Detected     Internal Control 05/06/2025 Passed     Lot Number 05/06/2025 [id number]     Expiration Date 05/06/2025 [id number]        Results  XR Wrist 3+ View Left  Result Date: 7/30/2025  Impression: No acute osseous abnormality. Degenerative changes as above Electronically Signed:  Brendon Bojorquez MD  7/30/2025 3:06 PM EDT  Workstation ID: [id number]        Procedures        Assessment and Plan   Diagnoses and all orders for this visit:    1. Non-recurrent acute suppurative otitis media of right ear without spontaneous rupture of tympanic membrane (Primary)  -     azithromycin (Zithromax) 250 MG tablet; Take as directed  Dispense: 6 tablet; Refill: 0    2. Jaw pain  -     predniSONE (DELTASONE) 20 MG tablet; Take 1 tablet by mouth Daily for 3 days.  Dispense: 3 tablet; Refill: 0  -     cyclobenzaprine (FLEXERIL) 10 MG tablet; Take 1 tablet by mouth At Night As Needed for Muscle Spasms.  Dispense: 30 tablet; Refill: 0    3. Chronic pain of left wrist  -     XR Wrist 3+ View Left        Assessment & Plan  1. Ear pain.  - The earache could be due to an infection, which may have led to inflammation causing the jaw to pop.  - Physical exam shows redness inside the ear.  - A short course of azithromycin will be prescribed, with instructions to take 2 tablets today and then 1 tablet daily for the following few days. A brief course of prednisone for 3 days will also be prescribed to reduce inflammation.  - A muscle relaxer will be provided for use at bedtime to prevent clenching during sleep. If the jaw pain persists despite improvement in the ear condition, it may indicate TMJ disorder. In such a case, a dental consultation would be recommended to explore potential malalignment issues and consider treatments such as bite guards or Botox injections.    2. Jaw pain.  - The jaw pain could be related to the ear infection causing inflammation.  - Physical exam shows soreness upon opening the jaw.  - A muscle relaxer will be prescribed for use at bedtime to prevent clenching during sleep.  - If the jaw pain persists despite improvement in the ear condition, it may indicate TMJ disorder. In such a case, a dental consultation would be recommended to explore potential malalignment issues and consider  treatments such as bite guards or Botox injections.    3. Left wrist pain.  - The left wrist pain does not appear to be indicative of carpal tunnel syndrome.  - Physical exam shows sharp pain upon twisting the wrist without numbness or tingling in fingers.  - An x-ray of the left wrist will be ordered today.  - If the results are normal, an MRI will be considered. If the MRI is not approved, a referral to [facility] will be made.          FOLLOW UP  No follow-ups on file.    Patient was given instructions and counseling regarding his condition or for health maintenance advice. Please see specific information pulled into the AVS if appropriate.       JERMAINE Rowe  07/31/25  09:01 EDT    CURRENT & DISCONTINUED MEDICATIONS  Current Outpatient Medications   Medication Instructions    albuterol (ACCUNEB) 0.63 MG/3ML nebulizer solution     azithromycin (Zithromax) 250 MG tablet Take as directed    cyclobenzaprine (FLEXERIL) 10 mg, Oral, Nightly PRN    EPINEPHrine (EPIPEN) 0.3 MG/0.3ML solution auto-injector injection USE AS DIRECTED FOR ACUTE ALLERGIC REACTION    fexofenadine (ALLEGRA ALLERGY) 180 mg, Oral, Daily    predniSONE (DELTASONE) 20 mg, Oral, Daily       There are no discontinued medications.     EMR Dragon/Transcription disclaimer:  Parts of this encounter note are electronic transcription/translation of spoken language to printed text.     Patient or patient representative verbalized consent for the use of Ambient Listening during the visit with  JERMAINE Rowe for chart documentation. 7/31/2025  14:33 EDT

## 2025-08-14 ENCOUNTER — OFFICE VISIT (OUTPATIENT)
Dept: FAMILY MEDICINE CLINIC | Facility: CLINIC | Age: 50
End: 2025-08-14
Payer: OTHER GOVERNMENT

## 2025-08-14 VITALS
WEIGHT: 173.2 LBS | DIASTOLIC BLOOD PRESSURE: 82 MMHG | OXYGEN SATURATION: 100 % | BODY MASS INDEX: 26.25 KG/M2 | TEMPERATURE: 98.2 F | HEART RATE: 83 BPM | HEIGHT: 68 IN | SYSTOLIC BLOOD PRESSURE: 118 MMHG

## 2025-08-14 DIAGNOSIS — J30.89 NON-SEASONAL ALLERGIC RHINITIS, UNSPECIFIED TRIGGER: Primary | ICD-10-CM

## 2025-08-14 DIAGNOSIS — E78.2 MIXED HYPERLIPIDEMIA: ICD-10-CM

## 2025-08-14 DIAGNOSIS — R53.83 FATIGUE, UNSPECIFIED TYPE: ICD-10-CM

## 2025-08-14 LAB
ALBUMIN SERPL-MCNC: 4.5 G/DL (ref 3.5–5.2)
ALBUMIN/GLOB SERPL: 1.8 G/DL
ALP SERPL-CCNC: 68 U/L (ref 39–117)
ALT SERPL W P-5'-P-CCNC: 23 U/L (ref 1–41)
ANION GAP SERPL CALCULATED.3IONS-SCNC: 11.6 MMOL/L (ref 5–15)
AST SERPL-CCNC: 25 U/L (ref 1–40)
BASOPHILS # BLD AUTO: 0.01 10*3/MM3 (ref 0–0.2)
BASOPHILS NFR BLD AUTO: 0.2 % (ref 0–1.5)
BILIRUB SERPL-MCNC: 0.5 MG/DL (ref 0–1.2)
BUN SERPL-MCNC: 19 MG/DL (ref 6–20)
BUN/CREAT SERPL: 14.1 (ref 7–25)
CALCIUM SPEC-SCNC: 9.9 MG/DL (ref 8.6–10.5)
CHLORIDE SERPL-SCNC: 105 MMOL/L (ref 98–107)
CHOLEST SERPL-MCNC: 167 MG/DL (ref 0–200)
CO2 SERPL-SCNC: 25.4 MMOL/L (ref 22–29)
CREAT SERPL-MCNC: 1.35 MG/DL (ref 0.76–1.27)
DEPRECATED RDW RBC AUTO: 42.2 FL (ref 37–54)
EGFRCR SERPLBLD CKD-EPI 2021: 64.4 ML/MIN/1.73
EOSINOPHIL # BLD AUTO: 0.11 10*3/MM3 (ref 0–0.4)
EOSINOPHIL NFR BLD AUTO: 2.2 % (ref 0.3–6.2)
ERYTHROCYTE [DISTWIDTH] IN BLOOD BY AUTOMATED COUNT: 12.8 % (ref 12.3–15.4)
FOLATE SERPL-MCNC: 5.81 NG/ML (ref 4.78–24.2)
GLOBULIN UR ELPH-MCNC: 2.5 GM/DL
GLUCOSE SERPL-MCNC: 83 MG/DL (ref 65–99)
HCT VFR BLD AUTO: 44.5 % (ref 37.5–51)
HDLC SERPL-MCNC: 57 MG/DL (ref 40–60)
HGB BLD-MCNC: 14.5 G/DL (ref 13–17.7)
IMM GRANULOCYTES # BLD AUTO: 0.01 10*3/MM3 (ref 0–0.05)
IMM GRANULOCYTES NFR BLD AUTO: 0.2 % (ref 0–0.5)
LDLC SERPL CALC-MCNC: 95 MG/DL (ref 0–100)
LDLC/HDLC SERPL: 1.65 {RATIO}
LYMPHOCYTES # BLD AUTO: 1.61 10*3/MM3 (ref 0.7–3.1)
LYMPHOCYTES NFR BLD AUTO: 32.9 % (ref 19.6–45.3)
MCH RBC QN AUTO: 30.1 PG (ref 26.6–33)
MCHC RBC AUTO-ENTMCNC: 32.6 G/DL (ref 31.5–35.7)
MCV RBC AUTO: 92.5 FL (ref 79–97)
MONOCYTES # BLD AUTO: 0.36 10*3/MM3 (ref 0.1–0.9)
MONOCYTES NFR BLD AUTO: 7.4 % (ref 5–12)
NEUTROPHILS NFR BLD AUTO: 2.79 10*3/MM3 (ref 1.7–7)
NEUTROPHILS NFR BLD AUTO: 57.1 % (ref 42.7–76)
NRBC BLD AUTO-RTO: 0 /100 WBC (ref 0–0.2)
PLATELET # BLD AUTO: 225 10*3/MM3 (ref 140–450)
PMV BLD AUTO: 12.1 FL (ref 6–12)
POTASSIUM SERPL-SCNC: 4.6 MMOL/L (ref 3.5–5.2)
PROT SERPL-MCNC: 7 G/DL (ref 6–8.5)
RBC # BLD AUTO: 4.81 10*6/MM3 (ref 4.14–5.8)
SODIUM SERPL-SCNC: 142 MMOL/L (ref 136–145)
T4 FREE SERPL-MCNC: 1.47 NG/DL (ref 0.92–1.68)
TRIGL SERPL-MCNC: 81 MG/DL (ref 0–150)
TSH SERPL DL<=0.05 MIU/L-ACNC: 1.43 UIU/ML (ref 0.27–4.2)
VIT B12 BLD-MCNC: 803 PG/ML (ref 211–946)
VLDLC SERPL-MCNC: 15 MG/DL (ref 5–40)
WBC NRBC COR # BLD AUTO: 4.89 10*3/MM3 (ref 3.4–10.8)

## 2025-08-14 PROCEDURE — 99213 OFFICE O/P EST LOW 20 MIN: CPT | Performed by: FAMILY MEDICINE

## 2025-08-14 PROCEDURE — 84443 ASSAY THYROID STIM HORMONE: CPT | Performed by: FAMILY MEDICINE

## 2025-08-14 PROCEDURE — 82746 ASSAY OF FOLIC ACID SERUM: CPT | Performed by: FAMILY MEDICINE

## 2025-08-14 PROCEDURE — 80061 LIPID PANEL: CPT | Performed by: FAMILY MEDICINE

## 2025-08-14 PROCEDURE — 85025 COMPLETE CBC W/AUTO DIFF WBC: CPT | Performed by: FAMILY MEDICINE

## 2025-08-14 PROCEDURE — 36415 COLL VENOUS BLD VENIPUNCTURE: CPT | Performed by: FAMILY MEDICINE

## 2025-08-14 PROCEDURE — 84439 ASSAY OF FREE THYROXINE: CPT | Performed by: FAMILY MEDICINE

## 2025-08-14 PROCEDURE — 82607 VITAMIN B-12: CPT | Performed by: FAMILY MEDICINE

## 2025-08-14 PROCEDURE — 80053 COMPREHEN METABOLIC PANEL: CPT | Performed by: FAMILY MEDICINE

## 2025-08-14 RX ORDER — FLUTICASONE PROPIONATE 50 MCG
2 SPRAY, SUSPENSION (ML) NASAL DAILY
Qty: 16 G | Refills: 5 | Status: SHIPPED | OUTPATIENT
Start: 2025-08-14